# Patient Record
Sex: FEMALE | Race: WHITE | NOT HISPANIC OR LATINO | Employment: UNEMPLOYED | URBAN - NONMETROPOLITAN AREA
[De-identification: names, ages, dates, MRNs, and addresses within clinical notes are randomized per-mention and may not be internally consistent; named-entity substitution may affect disease eponyms.]

---

## 2017-01-11 ENCOUNTER — TELEPHONE (OUTPATIENT)
Dept: SURGERY | Facility: CLINIC | Age: 57
End: 2017-01-11

## 2017-03-09 DIAGNOSIS — R92.8 ABNORMAL MAMMOGRAM: Primary | ICD-10-CM

## 2017-05-18 ENCOUNTER — TELEPHONE (OUTPATIENT)
Dept: SURGERY | Facility: CLINIC | Age: 57
End: 2017-05-18

## 2017-07-17 ENCOUNTER — TELEPHONE (OUTPATIENT)
Dept: SURGERY | Facility: CLINIC | Age: 57
End: 2017-07-17

## 2017-07-17 NOTE — TELEPHONE ENCOUNTER
Called patient regarding her follow up appointment but no answer and was unable to leave a message,

## 2017-08-22 ENCOUNTER — TELEPHONE (OUTPATIENT)
Dept: SURGERY | Facility: CLINIC | Age: 57
End: 2017-08-22

## 2024-05-06 ENCOUNTER — HOSPITAL ENCOUNTER (EMERGENCY)
Facility: HOSPITAL | Age: 64
Discharge: LEFT BEFORE DISCHARGE | DRG: 322 | End: 2024-05-06
Admitting: STUDENT IN AN ORGANIZED HEALTH CARE EDUCATION/TRAINING PROGRAM
Payer: COMMERCIAL

## 2024-05-06 ENCOUNTER — APPOINTMENT (OUTPATIENT)
Dept: GENERAL RADIOLOGY | Facility: HOSPITAL | Age: 64
DRG: 322 | End: 2024-05-06
Payer: COMMERCIAL

## 2024-05-06 VITALS
RESPIRATION RATE: 20 BRPM | HEIGHT: 67 IN | HEART RATE: 103 BPM | SYSTOLIC BLOOD PRESSURE: 196 MMHG | BODY MASS INDEX: 25.9 KG/M2 | OXYGEN SATURATION: 98 % | DIASTOLIC BLOOD PRESSURE: 106 MMHG | TEMPERATURE: 98.1 F | WEIGHT: 165 LBS

## 2024-05-06 LAB
ALBUMIN SERPL-MCNC: 4 G/DL (ref 3.5–5.2)
ALBUMIN/GLOB SERPL: 1.1 G/DL
ALP SERPL-CCNC: 154 U/L (ref 39–117)
ALT SERPL W P-5'-P-CCNC: 17 U/L (ref 1–33)
ANION GAP SERPL CALCULATED.3IONS-SCNC: 13.4 MMOL/L (ref 5–15)
AST SERPL-CCNC: 24 U/L (ref 1–32)
BASOPHILS # BLD AUTO: 0.02 10*3/MM3 (ref 0–0.2)
BASOPHILS NFR BLD AUTO: 0.4 % (ref 0–1.5)
BILIRUB SERPL-MCNC: 0.6 MG/DL (ref 0–1.2)
BUN SERPL-MCNC: 7 MG/DL (ref 8–23)
BUN/CREAT SERPL: 8.5 (ref 7–25)
CALCIUM SPEC-SCNC: 9.7 MG/DL (ref 8.6–10.5)
CHLORIDE SERPL-SCNC: 99 MMOL/L (ref 98–107)
CO2 SERPL-SCNC: 23.6 MMOL/L (ref 22–29)
CREAT SERPL-MCNC: 0.82 MG/DL (ref 0.57–1)
DEPRECATED RDW RBC AUTO: 40.9 FL (ref 37–54)
EGFRCR SERPLBLD CKD-EPI 2021: 80 ML/MIN/1.73
EOSINOPHIL # BLD AUTO: 0.1 10*3/MM3 (ref 0–0.4)
EOSINOPHIL NFR BLD AUTO: 1.8 % (ref 0.3–6.2)
ERYTHROCYTE [DISTWIDTH] IN BLOOD BY AUTOMATED COUNT: 11.9 % (ref 12.3–15.4)
GLOBULIN UR ELPH-MCNC: 3.5 GM/DL
GLUCOSE SERPL-MCNC: 380 MG/DL (ref 65–99)
HCT VFR BLD AUTO: 41.7 % (ref 34–46.6)
HGB BLD-MCNC: 13.6 G/DL (ref 12–15.9)
HOLD SPECIMEN: NORMAL
HOLD SPECIMEN: NORMAL
IMM GRANULOCYTES # BLD AUTO: 0.01 10*3/MM3 (ref 0–0.05)
IMM GRANULOCYTES NFR BLD AUTO: 0.2 % (ref 0–0.5)
LYMPHOCYTES # BLD AUTO: 2.61 10*3/MM3 (ref 0.7–3.1)
LYMPHOCYTES NFR BLD AUTO: 46.4 % (ref 19.6–45.3)
MCH RBC QN AUTO: 30.2 PG (ref 26.6–33)
MCHC RBC AUTO-ENTMCNC: 32.6 G/DL (ref 31.5–35.7)
MCV RBC AUTO: 92.5 FL (ref 79–97)
MONOCYTES # BLD AUTO: 0.35 10*3/MM3 (ref 0.1–0.9)
MONOCYTES NFR BLD AUTO: 6.2 % (ref 5–12)
NEUTROPHILS NFR BLD AUTO: 2.54 10*3/MM3 (ref 1.7–7)
NEUTROPHILS NFR BLD AUTO: 45 % (ref 42.7–76)
NRBC BLD AUTO-RTO: 0 /100 WBC (ref 0–0.2)
PLATELET # BLD AUTO: 154 10*3/MM3 (ref 140–450)
PMV BLD AUTO: 10.2 FL (ref 6–12)
POTASSIUM SERPL-SCNC: 3 MMOL/L (ref 3.5–5.2)
PROT SERPL-MCNC: 7.5 G/DL (ref 6–8.5)
RBC # BLD AUTO: 4.51 10*6/MM3 (ref 3.77–5.28)
SODIUM SERPL-SCNC: 136 MMOL/L (ref 136–145)
TROPONIN T SERPL HS-MCNC: 55 NG/L
WBC NRBC COR # BLD AUTO: 5.63 10*3/MM3 (ref 3.4–10.8)
WHOLE BLOOD HOLD COAG: NORMAL
WHOLE BLOOD HOLD SPECIMEN: NORMAL

## 2024-05-06 PROCEDURE — 84484 ASSAY OF TROPONIN QUANT: CPT | Performed by: STUDENT IN AN ORGANIZED HEALTH CARE EDUCATION/TRAINING PROGRAM

## 2024-05-06 PROCEDURE — 93010 ELECTROCARDIOGRAM REPORT: CPT | Performed by: INTERNAL MEDICINE

## 2024-05-06 PROCEDURE — 93005 ELECTROCARDIOGRAM TRACING: CPT | Performed by: STUDENT IN AN ORGANIZED HEALTH CARE EDUCATION/TRAINING PROGRAM

## 2024-05-06 PROCEDURE — 71045 X-RAY EXAM CHEST 1 VIEW: CPT

## 2024-05-06 PROCEDURE — 71045 X-RAY EXAM CHEST 1 VIEW: CPT | Performed by: RADIOLOGY

## 2024-05-06 PROCEDURE — 99284 EMERGENCY DEPT VISIT MOD MDM: CPT

## 2024-05-06 PROCEDURE — 80053 COMPREHEN METABOLIC PANEL: CPT | Performed by: STUDENT IN AN ORGANIZED HEALTH CARE EDUCATION/TRAINING PROGRAM

## 2024-05-06 PROCEDURE — 85025 COMPLETE CBC W/AUTO DIFF WBC: CPT | Performed by: STUDENT IN AN ORGANIZED HEALTH CARE EDUCATION/TRAINING PROGRAM

## 2024-05-06 RX ORDER — ASPIRIN 81 MG/1
324 TABLET, CHEWABLE ORAL ONCE
Status: DISCONTINUED | OUTPATIENT
Start: 2024-05-06 | End: 2024-05-06 | Stop reason: HOSPADM

## 2024-05-06 RX ORDER — SODIUM CHLORIDE 0.9 % (FLUSH) 0.9 %
10 SYRINGE (ML) INJECTION AS NEEDED
Status: DISCONTINUED | OUTPATIENT
Start: 2024-05-06 | End: 2024-05-06 | Stop reason: HOSPADM

## 2024-05-06 NOTE — ED NOTES
Patient's daughter presented to the ED triage desk at this time. Daughter reports that her mother was tired of waiting and that she wanted to sign out against medical advise. Patient was educated about the importance of staying and getting treatment and having improvement in her condition versus leaving against medical advise and having a worsening in her condition or possible death. Patient and daughter both verbalize understanding of discharge instructions, Called lead RN and made her aware.

## 2024-05-06 NOTE — ED NOTES
MEDICAL SCREENING:    Reason for Visit: Chest pain    Patient initially seen in triage.  The patient was advised further evaluation and diagnostic testing will be needed, some of the treatment and testing will be initiated in the lobby in order to begin the process.  The patient will be returned to the waiting area for the time being and possibly be re-assessed by a subsequent ED provider.  The patient will be brought back to the treatment area in as timely manner as possible.      Radha Wilkinson, APRN  05/06/24 1542

## 2024-05-07 ENCOUNTER — HOSPITAL ENCOUNTER (INPATIENT)
Facility: HOSPITAL | Age: 64
LOS: 2 days | Discharge: HOME | DRG: 322 | End: 2024-05-09
Attending: STUDENT IN AN ORGANIZED HEALTH CARE EDUCATION/TRAINING PROGRAM | Admitting: HOSPITALIST
Payer: COMMERCIAL

## 2024-05-07 ENCOUNTER — APPOINTMENT (OUTPATIENT)
Dept: GENERAL RADIOLOGY | Facility: HOSPITAL | Age: 64
DRG: 322 | End: 2024-05-07
Payer: COMMERCIAL

## 2024-05-07 DIAGNOSIS — I21.4 NSTEMI (NON-ST ELEVATED MYOCARDIAL INFARCTION): Primary | ICD-10-CM

## 2024-05-07 LAB
ALBUMIN SERPL-MCNC: 3.8 G/DL (ref 3.5–5.2)
ALBUMIN/GLOB SERPL: 1.1 G/DL
ALP SERPL-CCNC: 145 U/L (ref 39–117)
ALT SERPL W P-5'-P-CCNC: 20 U/L (ref 1–33)
ANION GAP SERPL CALCULATED.3IONS-SCNC: 10.2 MMOL/L (ref 5–15)
APTT PPP: 26.5 SECONDS (ref 26.5–34.5)
AST SERPL-CCNC: 62 U/L (ref 1–32)
BASOPHILS # BLD AUTO: 0.01 10*3/MM3 (ref 0–0.2)
BASOPHILS NFR BLD AUTO: 0.1 % (ref 0–1.5)
BILIRUB SERPL-MCNC: 1 MG/DL (ref 0–1.2)
BILIRUB UR QL STRIP: NEGATIVE
BUN SERPL-MCNC: 7 MG/DL (ref 8–23)
BUN/CREAT SERPL: 9.7 (ref 7–25)
CALCIUM SPEC-SCNC: 9.6 MG/DL (ref 8.6–10.5)
CHLORIDE SERPL-SCNC: 101 MMOL/L (ref 98–107)
CLARITY UR: CLEAR
CO2 SERPL-SCNC: 24.8 MMOL/L (ref 22–29)
COLOR UR: YELLOW
CREAT SERPL-MCNC: 0.72 MG/DL (ref 0.57–1)
DEPRECATED RDW RBC AUTO: 41.1 FL (ref 37–54)
EGFRCR SERPLBLD CKD-EPI 2021: 93.5 ML/MIN/1.73
EOSINOPHIL # BLD AUTO: 0.03 10*3/MM3 (ref 0–0.4)
EOSINOPHIL NFR BLD AUTO: 0.4 % (ref 0.3–6.2)
ERYTHROCYTE [DISTWIDTH] IN BLOOD BY AUTOMATED COUNT: 12 % (ref 12.3–15.4)
GEN 5 2HR TROPONIN T REFLEX: 413 NG/L
GLOBULIN UR ELPH-MCNC: 3.6 GM/DL
GLUCOSE SERPL-MCNC: 348 MG/DL (ref 65–99)
GLUCOSE UR STRIP-MCNC: ABNORMAL MG/DL
HCT VFR BLD AUTO: 43.5 % (ref 34–46.6)
HGB BLD-MCNC: 14.2 G/DL (ref 12–15.9)
HGB UR QL STRIP.AUTO: NEGATIVE
HOLD SPECIMEN: NORMAL
HOLD SPECIMEN: NORMAL
IMM GRANULOCYTES # BLD AUTO: 0.03 10*3/MM3 (ref 0–0.05)
IMM GRANULOCYTES NFR BLD AUTO: 0.4 % (ref 0–0.5)
INR PPP: 1.08 (ref 0.9–1.1)
KETONES UR QL STRIP: NEGATIVE
LEUKOCYTE ESTERASE UR QL STRIP.AUTO: NEGATIVE
LYMPHOCYTES # BLD AUTO: 2.09 10*3/MM3 (ref 0.7–3.1)
LYMPHOCYTES NFR BLD AUTO: 27.3 % (ref 19.6–45.3)
MAGNESIUM SERPL-MCNC: 2.1 MG/DL (ref 1.6–2.4)
MCH RBC QN AUTO: 30.3 PG (ref 26.6–33)
MCHC RBC AUTO-ENTMCNC: 32.6 G/DL (ref 31.5–35.7)
MCV RBC AUTO: 92.8 FL (ref 79–97)
MONOCYTES # BLD AUTO: 0.56 10*3/MM3 (ref 0.1–0.9)
MONOCYTES NFR BLD AUTO: 7.3 % (ref 5–12)
NEUTROPHILS NFR BLD AUTO: 4.93 10*3/MM3 (ref 1.7–7)
NEUTROPHILS NFR BLD AUTO: 64.5 % (ref 42.7–76)
NITRITE UR QL STRIP: NEGATIVE
NRBC BLD AUTO-RTO: 0 /100 WBC (ref 0–0.2)
PH UR STRIP.AUTO: 7.5 [PH] (ref 5–8)
PLATELET # BLD AUTO: 185 10*3/MM3 (ref 140–450)
PMV BLD AUTO: 10.5 FL (ref 6–12)
POTASSIUM SERPL-SCNC: 3.1 MMOL/L (ref 3.5–5.2)
PROT SERPL-MCNC: 7.4 G/DL (ref 6–8.5)
PROT UR QL STRIP: NEGATIVE
PROTHROMBIN TIME: 14.1 SECONDS (ref 12.1–14.7)
QT INTERVAL: 348 MS
QT INTERVAL: 362 MS
QT INTERVAL: 366 MS
QTC INTERVAL: 452 MS
QTC INTERVAL: 462 MS
QTC INTERVAL: 469 MS
RBC # BLD AUTO: 4.69 10*6/MM3 (ref 3.77–5.28)
SODIUM SERPL-SCNC: 136 MMOL/L (ref 136–145)
SP GR UR STRIP: 1.03 (ref 1–1.03)
TROPONIN T DELTA: 40 NG/L
TROPONIN T SERPL HS-MCNC: 373 NG/L
UFH PPP CHRO-ACNC: <0.1 IU/ML (ref 0.3–0.7)
UROBILINOGEN UR QL STRIP: ABNORMAL
WBC NRBC COR # BLD AUTO: 7.65 10*3/MM3 (ref 3.4–10.8)
WHOLE BLOOD HOLD COAG: NORMAL
WHOLE BLOOD HOLD SPECIMEN: NORMAL

## 2024-05-07 PROCEDURE — 71045 X-RAY EXAM CHEST 1 VIEW: CPT

## 2024-05-07 PROCEDURE — 25010000002 HEPARIN (PORCINE) 25000-0.45 UT/250ML-% SOLUTION: Performed by: NURSE PRACTITIONER

## 2024-05-07 PROCEDURE — 25010000002 HEPARIN (PORCINE) PER 1000 UNITS: Performed by: NURSE PRACTITIONER

## 2024-05-07 PROCEDURE — 93005 ELECTROCARDIOGRAM TRACING: CPT | Performed by: STUDENT IN AN ORGANIZED HEALTH CARE EDUCATION/TRAINING PROGRAM

## 2024-05-07 PROCEDURE — 99223 1ST HOSP IP/OBS HIGH 75: CPT | Performed by: HOSPITALIST

## 2024-05-07 PROCEDURE — 99285 EMERGENCY DEPT VISIT HI MDM: CPT

## 2024-05-07 PROCEDURE — 85520 HEPARIN ASSAY: CPT | Performed by: NURSE PRACTITIONER

## 2024-05-07 PROCEDURE — 36415 COLL VENOUS BLD VENIPUNCTURE: CPT

## 2024-05-07 PROCEDURE — 85025 COMPLETE CBC W/AUTO DIFF WBC: CPT | Performed by: STUDENT IN AN ORGANIZED HEALTH CARE EDUCATION/TRAINING PROGRAM

## 2024-05-07 PROCEDURE — 81003 URINALYSIS AUTO W/O SCOPE: CPT | Performed by: NURSE PRACTITIONER

## 2024-05-07 PROCEDURE — 85520 HEPARIN ASSAY: CPT

## 2024-05-07 PROCEDURE — 93010 ELECTROCARDIOGRAM REPORT: CPT | Performed by: INTERNAL MEDICINE

## 2024-05-07 PROCEDURE — 83735 ASSAY OF MAGNESIUM: CPT | Performed by: STUDENT IN AN ORGANIZED HEALTH CARE EDUCATION/TRAINING PROGRAM

## 2024-05-07 PROCEDURE — 85610 PROTHROMBIN TIME: CPT | Performed by: NURSE PRACTITIONER

## 2024-05-07 PROCEDURE — 93005 ELECTROCARDIOGRAM TRACING: CPT | Performed by: NURSE PRACTITIONER

## 2024-05-07 PROCEDURE — 71045 X-RAY EXAM CHEST 1 VIEW: CPT | Performed by: RADIOLOGY

## 2024-05-07 PROCEDURE — 85025 COMPLETE CBC W/AUTO DIFF WBC: CPT | Performed by: NURSE PRACTITIONER

## 2024-05-07 PROCEDURE — 80053 COMPREHEN METABOLIC PANEL: CPT | Performed by: STUDENT IN AN ORGANIZED HEALTH CARE EDUCATION/TRAINING PROGRAM

## 2024-05-07 PROCEDURE — 85730 THROMBOPLASTIN TIME PARTIAL: CPT | Performed by: NURSE PRACTITIONER

## 2024-05-07 PROCEDURE — 84484 ASSAY OF TROPONIN QUANT: CPT | Performed by: STUDENT IN AN ORGANIZED HEALTH CARE EDUCATION/TRAINING PROGRAM

## 2024-05-07 RX ORDER — SENNOSIDES 8.6 MG
1300 CAPSULE ORAL 2 TIMES DAILY PRN
COMMUNITY
End: 2024-05-09 | Stop reason: HOSPADM

## 2024-05-07 RX ORDER — HEPARIN SODIUM 5000 [USP'U]/ML
50 INJECTION, SOLUTION INTRAVENOUS; SUBCUTANEOUS AS NEEDED
Status: DISCONTINUED | OUTPATIENT
Start: 2024-05-07 | End: 2024-05-07

## 2024-05-07 RX ORDER — HEPARIN SODIUM 10000 [USP'U]/100ML
15 INJECTION, SOLUTION INTRAVENOUS
Status: DISCONTINUED | OUTPATIENT
Start: 2024-05-07 | End: 2024-05-08

## 2024-05-07 RX ORDER — ACETAMINOPHEN 500 MG
1000 TABLET ORAL ONCE
Status: COMPLETED | OUTPATIENT
Start: 2024-05-07 | End: 2024-05-07

## 2024-05-07 RX ORDER — HEPARIN SODIUM 5000 [USP'U]/ML
25 INJECTION, SOLUTION INTRAVENOUS; SUBCUTANEOUS AS NEEDED
Status: DISCONTINUED | OUTPATIENT
Start: 2024-05-07 | End: 2024-05-07

## 2024-05-07 RX ORDER — POTASSIUM CHLORIDE 20 MEQ/1
40 TABLET, EXTENDED RELEASE ORAL ONCE
Status: COMPLETED | OUTPATIENT
Start: 2024-05-07 | End: 2024-05-07

## 2024-05-07 RX ORDER — HEPARIN SODIUM 5000 [USP'U]/ML
4000 INJECTION, SOLUTION INTRAVENOUS; SUBCUTANEOUS ONCE
Status: COMPLETED | OUTPATIENT
Start: 2024-05-07 | End: 2024-05-07

## 2024-05-07 RX ORDER — SODIUM CHLORIDE 0.9 % (FLUSH) 0.9 %
10 SYRINGE (ML) INJECTION AS NEEDED
Status: DISCONTINUED | OUTPATIENT
Start: 2024-05-07 | End: 2024-05-09 | Stop reason: HOSPADM

## 2024-05-07 RX ORDER — ASPIRIN 81 MG/1
324 TABLET, CHEWABLE ORAL ONCE
Status: DISCONTINUED | OUTPATIENT
Start: 2024-05-07 | End: 2024-05-08

## 2024-05-07 RX ADMIN — NITROGLYCERIN 1 INCH: 20 OINTMENT TOPICAL at 13:36

## 2024-05-07 RX ADMIN — NITROGLYCERIN 1 INCH: 20 OINTMENT TOPICAL at 18:04

## 2024-05-07 RX ADMIN — POTASSIUM CHLORIDE 40 MEQ: 1500 TABLET, EXTENDED RELEASE ORAL at 13:36

## 2024-05-07 RX ADMIN — ACETAMINOPHEN 1000 MG: 500 TABLET ORAL at 17:21

## 2024-05-07 RX ADMIN — HEPARIN SODIUM 12 UNITS/KG/HR: 10000 INJECTION, SOLUTION INTRAVENOUS at 18:03

## 2024-05-07 RX ADMIN — HEPARIN SODIUM 4000 UNITS: 5000 INJECTION INTRAVENOUS; SUBCUTANEOUS at 18:02

## 2024-05-07 NOTE — Clinical Note
First balloon inflation max pressure = 16 mandi. First balloon inflation duration = 9 seconds. Second inflation of balloon - Max pressure = 16 mandi. 2nd Inflation of balloon - Duration = 4 seconds.

## 2024-05-07 NOTE — H&P
[facility] HOSPITALIST HISTORY AND PHYSICAL    Patient Identification:  Name:  Liyah Beverly  Age:  64 y.o.  Sex:  female  :  1960  MRN:  [id number]   Visit Number:  [id number]  Admit Date: 2024   Room number:  403/03  Primary Care Physician:  Naveed Granados DO     Chief complaint:    Chief Complaint   Patient presents with    Chest Pain       History of presenting illness:  64 y.o. female with history significant for DVT x 2 and PE, she was supposed to be on anticoagulation, in 2016 she self discontinued her medication, she has history of osteoarthritis, essential hypertension presented to emergency room with chief complaints of burning sensation of the chest.    As per patient started 3 days prior to admission described as burning sensation retrosternal radiating to the throat, she thought it is reflux she took Mylanta with some relief.  The next day it continued on and off sometimes associated with activity sometimes at rest.  She decided come to the emergency room yesterday but could not wait for the results of blood test she left.  During that time EKG left anterior fascicular block and LVH with mild elevation of the ST.  V1 V2..  As per patient at home her chest discomfort continued associated with pain on both shoulders and tingling of both hands.  She went her family doctor and was advised to come to the emergency room.  In the route she received nitroglycerin and aspirin which relieved her symptoms.    At the emergency room her blood pressure is 152/93 heart rate 81 O2 saturation was 92 to 95% EKG no ST elevation with LVH and left anterior fascicular block, initial troponin yesterday was 55 today it is 373, repeat is 413 with a delta of +40.  She is also noted to have glucose of 348, she denies history of diabetes.    Case was discussed with Dr. Rust and recommended to have the case and EKG reviewed by Dr. Cruz/interventional cardiology, Dr. Cruz recommended start the  Pt called and she was informed that her script was sent in   patient heparin drip beta-blocker aspirin and statins.  ---------------------------------------------------------------------------------------------------------------------   Review of Systems   Constitutional:  Negative for activity change, appetite change, chills, diaphoresis, fatigue, fever and unexpected weight change.   HENT: Negative.     Eyes: Negative.    Respiratory: Negative.     Cardiovascular:  Positive for chest pain. Negative for palpitations and leg swelling.   Gastrointestinal: Negative.    Endocrine: Negative.    Genitourinary: Negative.    Musculoskeletal: Negative.    Skin: Negative.    Allergic/Immunologic: Negative.    Neurological: Negative.    Hematological: Negative.    Psychiatric/Behavioral: Negative.         ---------------------------------------------------------------------------------------------------------------------   Past Medical History:   Diagnosis Date    Arthritis     Deep venous thrombosis     Hypertension     Pulmonary embolism      Past Surgical History:   Procedure Laterality Date    BREAST SURGERY      COLONOSCOPY      HYSTERECTOMY      INNER EAR SURGERY      OTHER SURGICAL HISTORY      diagnostic esophagogastroduodenoscopy    TUBAL ABDOMINAL LIGATION       Family History   Problem Relation Age of Onset    Heart disease Mother     Diabetes Mother     Diabetes Sister     Heart disease Brother     Diabetes Brother      Social History     Socioeconomic History    Marital status:    Tobacco Use    Smoking status: Former   Substance and Sexual Activity    Alcohol use: No    Drug use: No     ---------------------------------------------------------------------------------------------------------------------   Allergies:  Patient has no known allergies.  ---------------------------------------------------------------------------------------------------------------------   Prior to Admission Medications       Prescriptions Last Dose Informant Patient Reported? Taking?     atorvastatin (LIPITOR) 20 MG tablet   Yes No    Take  by mouth.    lisinopril-hydrochlorothiazide (PRINZIDE,ZESTORETIC) 20-12.5 MG per tablet   Yes No    Take  by mouth.    meloxicam (MOBIC) 7.5 MG tablet   Yes No    Take 7.5 mg by mouth daily.    pantoprazole (PROTONIX) 40 MG EC tablet   Yes No    Take  by mouth.    ranitidine (ZANTAC) 150 MG tablet   Yes No    Take  by mouth.    rivaroxaban (XARELTO) 20 MG tablet   Yes No    Take  by mouth.          ---------------------------------------------------------------------------------------------------------------------   Vital Signs:  Temp:  [98.6 °F (37 °C)] 98.6 °F (37 °C)  Heart Rate:  [81-98] 98  Resp:  [18] 18  BP: (152-171)/(88-97) 157/97    Mean Arterial Pressure (Non-Invasive) for the past 24 hrs (Last 3 readings):   Noninvasive MAP (mmHg)   05/07/24 1700 116   05/07/24 1615 118   05/07/24 1600 113     SpO2:  [92 %-95 %] 94 %  on   ;   Device (Oxygen Therapy): room air  Body mass index is 25.82 kg/m².    Wt Readings from Last 3 Encounters:   05/07/24 74.8 kg (164 lb 14.5 oz)   05/06/24 74.8 kg (165 lb)   05/12/16 99.6 kg (219 lb 9.6 oz)               ---------------------------------------------------------------------------------------------------------------------   Physical Exam:  Constitutional:  Well-developed and well-nourished.  No respiratory distress.      HENT:  Head: Normocephalic and atraumatic.  Mouth:  Moist mucous membranes.    Eyes:  Conjunctivae and EOM are normal.  Pupils are equal, round, and reactive to light.  No scleral icterus.  Neck:  Neck supple.  No JVD present.    Cardiovascular:  Normal rate, regular rhythm and normal heart sounds with no murmur.  Pulmonary/Chest:  No respiratory distress, no wheezes, no crackles, with normal breath sounds and good air movement.  Abdominal:  Soft.  Bowel sounds are normal.  No distension and no tenderness.   Musculoskeletal:  No edema, no tenderness, and no deformity.  No red or swollen joints  "anywhere.    Neurological:  Alert and oriented to person, place, and time.  No cranial nerve deficit.  No tongue deviation.  No facial droop.  No slurred speech.   Skin:  Skin is warm and dry.  No rash noted.  No pallor.   Peripheral vascular:  No edema and strong pulses on all 4 extremities.  Genitourinary:  ---------------------------------------------------------------------------------------------------------------------  EKG:                Telemetry: Sinus rhythm 86 bpm  I have personally looked at both the EKG and the telemetry strips.  --------------------------------------------------------------------------------------------------------------------  Results from last 7 days   Lab Units 05/07/24  1457 05/07/24  1242 05/06/24  1556   HSTROP T ng/L 413* 373* 55*     Results from last 7 days   Lab Units 05/07/24  1640 05/07/24 1242 05/06/24  1556   WBC 10*3/mm3  --  7.65 5.63   HEMOGLOBIN g/dL  --  14.2 13.6   HEMATOCRIT %  --  43.5 41.7   MCV fL  --  92.8 92.5   MCHC g/dL  --  32.6 32.6   PLATELETS 10*3/mm3  --  185 154   INR  1.08  --   --      Results from last 7 days   Lab Units 05/07/24  1242 05/06/24  1556   SODIUM mmol/L 136 136   POTASSIUM mmol/L 3.1* 3.0*   MAGNESIUM mg/dL 2.1  --    CHLORIDE mmol/L 101 99   CO2 mmol/L 24.8 23.6   BUN mg/dL 7* 7*   CREATININE mg/dL 0.72 0.82   CALCIUM mg/dL 9.6 9.7   GLUCOSE mg/dL 348* 380*   ALBUMIN g/dL 3.8 4.0   BILIRUBIN mg/dL 1.0 0.6   ALK PHOS U/L 145* 154*   AST (SGOT) U/L 62* 24   ALT (SGPT) U/L 20 17   Estimated Creatinine Clearance: 83.4 mL/min (by C-G formula based on SCr of 0.72 mg/dL).    No results found for: \"HGBA1C\", \"POCGLU\"  No results found for: \"AMMONIA\"    Results from last 7 days   Lab Units 05/07/24  1559   NITRITE UA  Negative     No results found for: \"BLOODCX\"No results found for: \"RESPCX\"No results found for: \"URINECX\"No results found for: \"WOUNDCX\"No results found for: \"BODYFLDCX\"No results found for: \"STOOLCX\"  pH No results found for: " "\"PHART\"   pO2 No results found for: \"PO2ART\"   pCO2 No results found for: \"SDA6KYF\"   HCO3 No results found for: \"PVL8UDQ\"     I have personally looked at the labs and they are summarized above.  ----------------------------------------------------------------------------------------------------------------------  Imaging Results (Last 24 Hours)       Procedure Component Value Units Date/Time    XR Chest 1 View [[id number]] Collected: 05/07/24 1341     Updated: 05/07/24 1343    Narrative:      XR CHEST 1 VW-     CLINICAL INDICATION: Chest Pain Protocol        COMPARISON: None immediately available      TECHNIQUE: Single frontal view of the chest.     FINDINGS:     LUNGS: Lungs are adequately aerated.      HEART AND MEDIASTINUM: Heart and mediastinal contours are unremarkable        SKELETON: Bony and soft tissue structures are unremarkable.             Impression:      No radiographic evidence of acute cardiac or pulmonary disease.           This report was finalized on 5/7/2024 1:41 PM by Dr. Saravanan Morales MD.             I have personally reviewed the radiology images and read the final radiology report.  ----------------------------------------------------------------------------------------------------------------------  Assessment and Plan:  -Chest pain non-STEMI type I  -History of essential hypertension  -History of hyperlipidemia  -History of DVT x 2 and 1 PE patient stopped taking anticoagulation 2016   -History of osteoarthritis  -Hyperglycemia    Case discussed with Dr. Cruz/interventional cardiology EKG reviewed recommended medication management at this time and heparin drip will keep the patient n.p.o. for possible cardiac cath in the morning, check 2D echo, stat lipid panel, TSH, monitor electrolytes.  Check A1c, Accu-Chek and sliding scale.    With regards to her history of multiple DVT and PE, patient was supposed to be on blood thinner for life but she discontinued it herself stating she does not " think she needs it anymore.  Will continue heparin drip, she will be needing anticoagulation on discharge.    Plan outlined to patient together with her  and agreed, further management may change as condition evolves.    Audrey Page MD  05/07/24  17:40 EDT

## 2024-05-07 NOTE — ED PROVIDER NOTES
Subjective   History of Present Illness  Patient is a 64-year-old female who presents today complaining of chest discomfort.  Patient reports that she has had heartburn off and on for several days.  Patient reports that today that the pain was more in her anterior chest and states that it was a pressure and states that she had pain down both arms and states that she had tingling in her fingertips bilaterally.  Patient denies any shortness of breath.  Patient denies diaphoresis.  Patient denies cough or hemoptysis.  Patient denies changes in bowel or bladder patterns.  Patient reports that she went to [facility] today and they sent her to the ER for further evaluation.  She reports that she has had 4 baby aspirin for total of 3 and 24 mg prior to arrival.  Patient reports that they did give her 1 sublingual nitroglycerin in the ambulance and states that that did resolve chest pain.  Patient denies chest pain currently.    Chest Pain      Review of Systems   Constitutional: Negative.    HENT: Negative.     Eyes: Negative.    Respiratory: Negative.     Cardiovascular:  Positive for chest pain.   Gastrointestinal: Negative.    Endocrine: Negative.    Genitourinary: Negative.    Musculoskeletal: Negative.    Skin: Negative.    Allergic/Immunologic: Negative.    Neurological: Negative.    Hematological: Negative.    Psychiatric/Behavioral: Negative.         Past Medical History:   Diagnosis Date    Arthritis     Deep venous thrombosis     Hypertension     Pulmonary embolism        No Known Allergies    Past Surgical History:   Procedure Laterality Date    BREAST SURGERY      COLONOSCOPY      HYSTERECTOMY      INNER EAR SURGERY      OTHER SURGICAL HISTORY      diagnostic esophagogastroduodenoscopy    TUBAL ABDOMINAL LIGATION         Family History   Problem Relation Age of Onset    Heart disease Mother     Diabetes Mother     Diabetes Sister     Heart disease Brother     Diabetes Brother        Social History      Socioeconomic History    Marital status:    Tobacco Use    Smoking status: Former   Substance and Sexual Activity    Alcohol use: No    Drug use: No           Objective   Physical Exam  Vitals and nursing note reviewed.   Constitutional:       Appearance: She is well-developed.   HENT:      Head: Normocephalic.      Right Ear: External ear normal.      Left Ear: External ear normal.   Eyes:      Conjunctiva/sclera: Conjunctivae normal.      Pupils: Pupils are equal, round, and reactive to light.   Cardiovascular:      Rate and Rhythm: Normal rate and regular rhythm.      Heart sounds: Normal heart sounds.   Pulmonary:      Effort: Pulmonary effort is normal.      Breath sounds: Normal breath sounds.   Abdominal:      General: Bowel sounds are normal.      Palpations: Abdomen is soft.   Musculoskeletal:         General: Normal range of motion.      Cervical back: Normal range of motion and neck supple.   Skin:     General: Skin is warm and dry.      Capillary Refill: Capillary refill takes less than 2 seconds.   Neurological:      Mental Status: She is alert and oriented to person, place, and time.   Psychiatric:         Behavior: Behavior normal.         Thought Content: Thought content normal.         Procedures           ED Course  ED Course as of 05/07/24 1400   Tue May 07, 2024   1304 EKG notes sinus rhythm.  99 bpm.  Left anterior fascicular block noted.  No acute ST elevation.  QTc 469.  Electronically signed by Ghassan Stearns DO, 05/07/24, 1:05 PM EDT.   [SF]      ED Course User Index  [SF] Ghassan Stearns DO                                             Medical Decision Making  Amount and/or Complexity of Data Reviewed  Labs: ordered.  Radiology: ordered.  ECG/medicine tests: ordered.    Risk  OTC drugs.  Prescription drug management.        Final diagnoses:   None       ED Disposition  ED Disposition       None            No follow-up provider specified.       Medication List      No changes were  made to your prescriptions during this visit.          Daily.     atorvastatin 80 MG tablet  Commonly known as: LIPITOR  Take 1 tablet by mouth Every Night.     B-D UF III MINI PEN NEEDLES 31G X 5 MM misc  Generic drug: Insulin Pen Needle  Use to inject insulin under the skin into the appropriate area as directed Daily.     Brilinta 90 MG tablet tablet  Generic drug: ticagrelor  Take 1 tablet by mouth 2 (Two) Times a Day.     carvedilol 6.25 MG tablet  Commonly known as: COREG  Take 1 tablet by mouth 2 (Two) Times a Day With Meals.     Eliquis 5 MG tablet tablet  Generic drug: apixaban  Take 1 tablet by mouth 2 (Two) Times a Day.     famotidine 20 MG tablet  Commonly known as: Pepcid  Take 1 tablet by mouth 2 (Two) Times a Day.     Jardiance 10 MG tablet tablet  Generic drug: empagliflozin  Take 1 tablet by mouth Daily.     Lantus SoloStar 100 UNIT/ML injection pen  Generic drug: Insulin Glargine  Inject 10 Units under the skin into the appropriate area as directed Daily.     losartan 25 MG tablet  Commonly known as: COZAAR  Take 1 tablet by mouth Daily.     nitroglycerin 0.4 MG SL tablet  Commonly known as: NITROSTAT  Place 1 tablet under the tongue Every 5 minutes as needed for chest pain (systolic blood pressure greater than 100). Take no more than 3 doses in 15 minutes.     ONE TOUCH ULTRA 2 w/Device kit  Use to check blood sugar before breakfast, supper, and at bedtime or as instructed.     OneTouch Delica Plus [id number] misc  Use 2 (Two) times a day before meals as instructed.     OneTouch Ultra test strip  Generic drug: glucose blood  Use as instructed before breakfast and before supper and at bedtime.            Stop      acetaminophen 650 MG 8 hr tablet  Commonly known as: TYLENOL               Where to Get Your Medications        These medications were sent to [facility]  [address] [place] [address]      Hours: Monday to Friday 7 AM to 6 PM Phone: [phone]   Aspirin Low Dose 81 MG EC tablet  atorvastatin 80 MG tablet  B-D UF III  MINI PEN NEEDLES 31G X 5 MM misc  Brilinta 90 MG tablet tablet  carvedilol 6.25 MG tablet  Eliquis 5 MG tablet tablet  famotidine 20 MG tablet  Jardiance 10 MG tablet tablet  Lantus SoloStar 100 UNIT/ML injection pen  losartan 25 MG tablet  nitroglycerin 0.4 MG SL tablet  ONE TOUCH ULTRA 2 w/Device kit  OneTouch Delica Plus [id number] misc  OneTouch Ultra test strip            Uriah España, APRN  05/24/24 1009

## 2024-05-07 NOTE — Clinical Note
A 4 fr sheath was successfully inserted with ultrasound guidance into the right femoral artery. Sheath insertion not delayed.

## 2024-05-07 NOTE — CASE MANAGEMENT/SOCIAL WORK
Discharge Planning Assessment   [facility]     Patient Name: Liyah Foote  MRN: [id number]  Today's Date: 5/7/2024    Admit Date: 5/7/2024    Plan: Spoke with patient and family at bedside. Patient lives at home and will return at discharge. Patient has no POA or Living Will. Patient uses no DME, Oxygen or HH. Patient PCP Naveed Granados and [facility]. Patients family will transport home at discharge.   Discharge Needs Assessment       Row Name 05/07/24 1652       Living Environment    People in Home spouse    Name(s) of People in Home BRODIE FOOTE Spouse [phone]    Potentially Unsafe Housing Conditions none    In the past 12 months has the electric, gas, oil, or water company threatened to shut off services in your home? No    Primary Care Provided by self    Provides Primary Care For no one    Family Caregiver if Needed spouse    Quality of Family Relationships helpful;involved;supportive    Able to Return to Prior Arrangements yes       Resource/Environmental Concerns    Resource/Environmental Concerns none    Transportation Concerns none       Transportation Needs    In the past 12 months, has lack of transportation kept you from medical appointments or from getting medications? no    In the past 12 months, has lack of transportation kept you from meetings, work, or from getting things needed for daily living? No       Food Insecurity    Within the past 12 months, you worried that your food would run out before you got the money to buy more. Never true    Within the past 12 months, the food you bought just didn't last and you didn't have money to get more. Never true       Transition Planning    Patient/Family Anticipates Transition to home with family    Patient/Family Anticipated Services at Transition none    Transportation Anticipated family or friend will provide       Discharge Needs Assessment    Readmission Within the Last 30 Days no previous admission in last 30 days    Equipment  Currently Used at Home none    Concerns to be Addressed discharge planning    Anticipated Changes Related to Illness none    Equipment Needed After Discharge none                   Discharge Plan       Row Name 05/07/24 5138       Plan    Plan Spoke with patient and family at bedside. Patient lives at home and will return at discharge. Patient has no POA or Living Will. Patient uses no DME, Oxygen or HH. Patient PCP Naveed Granados and [facility]. Patients family will transport home at discharge.    Patient/Family in Agreement with Plan yes                  Continued Care and Services - Admitted Since 5/7/2024    No active coordination exists for this encounter.          Demographic Summary    No documentation.                Diane Fox

## 2024-05-07 NOTE — Clinical Note
First balloon inflation max pressure = 8 mandi. First balloon inflation duration = 6 seconds. Second inflation of balloon - Max pressure = 4 mandi. 2nd Inflation of balloon - Duration = 6 seconds. Third inflation of balloon - Max pressure = 10 mandi. 3rd Inflation of balloon - Duration = 17 seconds.

## 2024-05-07 NOTE — PROGRESS NOTES
[facility]PARIN INFUSION  Liyah Beverly is a  64 y.o. female receiving heparin infusion.     Therapy for (VTE/Cardiac):   Cardiac  Patient Dosing Weight: 74.8 kg  Initial Bolus (Y/N):   Y  Any Bolus (Y/N):   Y        Signs or Symptoms of Bleeding: N    Cardiac or Other (Not VTE)   Initial Bolus: 60 units/kg (Max 4,000 units)  Initial rate: 12 units/kg/hr (Max 1,000 units/hr)   Anti Xa Rebolus Infusion Hold time Change infusion Dose (Units/kg/hr) Next Anti Xa or aPTT Level Due   < 0.11 50 Units/kg  (4000 Units Max) None Increase by  3 Units/kg/hr 6 hours   0.11- 0.19 25 Units/kg  (2000 Units Max) None Increase by  2 Units/kg/hr 6 hours   0.2 - 0.29 0 None Increase by  1 Units/kg/hr 6 hours   0.3 - 0.5 0 None No Change 6 hours (after 2 consecutive levels in range check q24h @0700)   0.51 - 0.6 0 None Decrease by  1 Units/kg/hr 6 hours   0.61 - 0.8 0 30 Minutes Decrease by  2 Units/kg/hr 6 hours   0.81 - 1 0 60 Minutes Decrease by  3 Units/kg/hr 6 hours   >1 0 Hold  After Anti Xa less than 0.5 decrease previous rate by  4 Units/kg/hr  Every 2 hours until Anti Xa  less than 0.5 then when infusion restarts in 6 hours         Recommend anti-Xa every 6 hours.          Date   Time   Anti-Xa Current Rate (Unit/kg/hr) Bolus   (Units) Rate Change   (Unit/kg/hr) New Rate (Unit/kg/hr) Next   Anti-Xa Comments  Pump Check Daily   1640 1640 <0.1 - 4000 initial 12 2330 Discussed initial rate with nurse Pedroza.                                                                                                                                                                                                                                  Pharmacy will continue to follow anti-Xa results and monitor for signs and symptoms of bleeding or thrombosis.    Thank you,    Kelsy Diallo, PharmD  5/7/2024  17:03 EDT

## 2024-05-08 ENCOUNTER — APPOINTMENT (OUTPATIENT)
Dept: CARDIOLOGY | Facility: HOSPITAL | Age: 64
DRG: 322 | End: 2024-05-08
Payer: COMMERCIAL

## 2024-05-08 LAB
ALBUMIN SERPL-MCNC: 3.6 G/DL (ref 3.5–5.2)
ALBUMIN/GLOB SERPL: 0.9 G/DL
ALP SERPL-CCNC: 173 U/L (ref 39–117)
ALT SERPL W P-5'-P-CCNC: 19 U/L (ref 1–33)
ANION GAP SERPL CALCULATED.3IONS-SCNC: 10.3 MMOL/L (ref 5–15)
AST SERPL-CCNC: 53 U/L (ref 1–32)
BASOPHILS # BLD AUTO: 0.02 10*3/MM3 (ref 0–0.2)
BASOPHILS # BLD AUTO: 0.02 10*3/MM3 (ref 0–0.2)
BASOPHILS NFR BLD AUTO: 0.2 % (ref 0–1.5)
BASOPHILS NFR BLD AUTO: 0.2 % (ref 0–1.5)
BH CV ECHO MEAS - AO MAX PG: 8.4 MMHG
BH CV ECHO MEAS - AO MEAN PG: 5 MMHG
BH CV ECHO MEAS - AO ROOT DIAM: 2.9 CM
BH CV ECHO MEAS - AO V2 MAX: 145 CM/SEC
BH CV ECHO MEAS - AO V2 VTI: 26.2 CM
BH CV ECHO MEAS - EDV(CUBED): 85.2 ML
BH CV ECHO MEAS - EDV(MOD-SP4): 53.2 ML
BH CV ECHO MEAS - EF(MOD-BP): 44 %
BH CV ECHO MEAS - EF(MOD-SP4): 44 %
BH CV ECHO MEAS - ESV(CUBED): 24.1 ML
BH CV ECHO MEAS - ESV(MOD-SP4): 29.8 ML
BH CV ECHO MEAS - FS: 34.3 %
BH CV ECHO MEAS - IVS/LVPW: 0.72 CM
BH CV ECHO MEAS - IVSD: 1.18 CM
BH CV ECHO MEAS - LA DIMENSION: 2.9 CM
BH CV ECHO MEAS - LAT PEAK E' VEL: 7.5 CM/SEC
BH CV ECHO MEAS - LV DIASTOLIC VOL/BSA (35-75): 29.3 CM2
BH CV ECHO MEAS - LV MASS(C)D: 242.9 GRAMS
BH CV ECHO MEAS - LV SYSTOLIC VOL/BSA (12-30): 16.4 CM2
BH CV ECHO MEAS - LVIDD: 4.4 CM
BH CV ECHO MEAS - LVIDS: 2.9 CM
BH CV ECHO MEAS - LVOT AREA: 2.01 CM2
BH CV ECHO MEAS - LVOT DIAM: 1.6 CM
BH CV ECHO MEAS - LVPWD: 1.64 CM
BH CV ECHO MEAS - MED PEAK E' VEL: 5.9 CM/SEC
BH CV ECHO MEAS - MV A MAX VEL: 122 CM/SEC
BH CV ECHO MEAS - MV E MAX VEL: 87.5 CM/SEC
BH CV ECHO MEAS - MV E/A: 0.72
BH CV ECHO MEAS - PA ACC TIME: 0.1 SEC
BH CV ECHO MEAS - SV(MOD-SP4): 23.4 ML
BH CV ECHO MEAS - SVI(MOD-SP4): 12.9 ML/M2
BH CV ECHO MEAS - TAPSE (>1.6): 1.8 CM
BH CV ECHO MEASUREMENTS AVERAGE E/E' RATIO: 13.06
BILIRUB SERPL-MCNC: 1.8 MG/DL (ref 0–1.2)
BUN SERPL-MCNC: 7 MG/DL (ref 8–23)
BUN/CREAT SERPL: 11.5 (ref 7–25)
CALCIUM SPEC-SCNC: 9 MG/DL (ref 8.6–10.5)
CATH EF ESTIMATED: 55 %
CHLORIDE SERPL-SCNC: 101 MMOL/L (ref 98–107)
CHOLEST SERPL-MCNC: 189 MG/DL (ref 0–200)
CO2 SERPL-SCNC: 22.7 MMOL/L (ref 22–29)
CREAT SERPL-MCNC: 0.61 MG/DL (ref 0.57–1)
DEPRECATED RDW RBC AUTO: 41.5 FL (ref 37–54)
DEPRECATED RDW RBC AUTO: 41.6 FL (ref 37–54)
EGFRCR SERPLBLD CKD-EPI 2021: 100 ML/MIN/1.73
EOSINOPHIL # BLD AUTO: 0.02 10*3/MM3 (ref 0–0.4)
EOSINOPHIL # BLD AUTO: 0.03 10*3/MM3 (ref 0–0.4)
EOSINOPHIL NFR BLD AUTO: 0.2 % (ref 0.3–6.2)
EOSINOPHIL NFR BLD AUTO: 0.4 % (ref 0.3–6.2)
ERYTHROCYTE [DISTWIDTH] IN BLOOD BY AUTOMATED COUNT: 11.9 % (ref 12.3–15.4)
ERYTHROCYTE [DISTWIDTH] IN BLOOD BY AUTOMATED COUNT: 12 % (ref 12.3–15.4)
GLOBULIN UR ELPH-MCNC: 3.8 GM/DL
GLUCOSE BLDC GLUCOMTR-MCNC: 156 MG/DL (ref 70–130)
GLUCOSE BLDC GLUCOMTR-MCNC: 203 MG/DL (ref 70–130)
GLUCOSE BLDC GLUCOMTR-MCNC: 208 MG/DL (ref 70–130)
GLUCOSE BLDC GLUCOMTR-MCNC: 226 MG/DL (ref 70–130)
GLUCOSE BLDC GLUCOMTR-MCNC: 244 MG/DL (ref 70–130)
GLUCOSE SERPL-MCNC: 271 MG/DL (ref 65–99)
HBA1C MFR BLD: 9.8 % (ref 4.8–5.6)
HCT VFR BLD AUTO: 41.7 % (ref 34–46.6)
HCT VFR BLD AUTO: 42.9 % (ref 34–46.6)
HDLC SERPL-MCNC: 44 MG/DL (ref 40–60)
HGB BLD-MCNC: 13.5 G/DL (ref 12–15.9)
HGB BLD-MCNC: 13.8 G/DL (ref 12–15.9)
IMM GRANULOCYTES # BLD AUTO: 0.02 10*3/MM3 (ref 0–0.05)
IMM GRANULOCYTES # BLD AUTO: 0.03 10*3/MM3 (ref 0–0.05)
IMM GRANULOCYTES NFR BLD AUTO: 0.2 % (ref 0–0.5)
IMM GRANULOCYTES NFR BLD AUTO: 0.3 % (ref 0–0.5)
LDLC SERPL CALC-MCNC: 127 MG/DL (ref 0–100)
LDLC/HDLC SERPL: 2.85 {RATIO}
LEFT ATRIUM VOLUME INDEX: 32.4 ML/M2
LYMPHOCYTES # BLD AUTO: 2.96 10*3/MM3 (ref 0.7–3.1)
LYMPHOCYTES # BLD AUTO: 3.2 10*3/MM3 (ref 0.7–3.1)
LYMPHOCYTES NFR BLD AUTO: 33.2 % (ref 19.6–45.3)
LYMPHOCYTES NFR BLD AUTO: 38.1 % (ref 19.6–45.3)
MCH RBC QN AUTO: 30.3 PG (ref 26.6–33)
MCH RBC QN AUTO: 30.4 PG (ref 26.6–33)
MCHC RBC AUTO-ENTMCNC: 32.2 G/DL (ref 31.5–35.7)
MCHC RBC AUTO-ENTMCNC: 32.4 G/DL (ref 31.5–35.7)
MCV RBC AUTO: 93.9 FL (ref 79–97)
MCV RBC AUTO: 94.1 FL (ref 79–97)
MONOCYTES # BLD AUTO: 0.7 10*3/MM3 (ref 0.1–0.9)
MONOCYTES # BLD AUTO: 0.79 10*3/MM3 (ref 0.1–0.9)
MONOCYTES NFR BLD AUTO: 8.3 % (ref 5–12)
MONOCYTES NFR BLD AUTO: 8.9 % (ref 5–12)
NEUTROPHILS NFR BLD AUTO: 4.43 10*3/MM3 (ref 1.7–7)
NEUTROPHILS NFR BLD AUTO: 5.1 10*3/MM3 (ref 1.7–7)
NEUTROPHILS NFR BLD AUTO: 52.8 % (ref 42.7–76)
NEUTROPHILS NFR BLD AUTO: 57.2 % (ref 42.7–76)
NRBC BLD AUTO-RTO: 0 /100 WBC (ref 0–0.2)
NRBC BLD AUTO-RTO: 0 /100 WBC (ref 0–0.2)
PLATELET # BLD AUTO: 155 10*3/MM3 (ref 140–450)
PLATELET # BLD AUTO: 161 10*3/MM3 (ref 140–450)
PMV BLD AUTO: 10.6 FL (ref 6–12)
PMV BLD AUTO: 11.4 FL (ref 6–12)
POTASSIUM SERPL-SCNC: 3.4 MMOL/L (ref 3.5–5.2)
POTASSIUM SERPL-SCNC: 4.3 MMOL/L (ref 3.5–5.2)
PROT SERPL-MCNC: 7.4 G/DL (ref 6–8.5)
RBC # BLD AUTO: 4.44 10*6/MM3 (ref 3.77–5.28)
RBC # BLD AUTO: 4.56 10*6/MM3 (ref 3.77–5.28)
SODIUM SERPL-SCNC: 134 MMOL/L (ref 136–145)
TRIGL SERPL-MCNC: 98 MG/DL (ref 0–150)
TSH SERPL DL<=0.05 MIU/L-ACNC: 1.15 UIU/ML (ref 0.27–4.2)
UFH PPP CHRO-ACNC: 0.1 IU/ML (ref 0.3–0.7)
UFH PPP CHRO-ACNC: 0.33 IU/ML (ref 0.3–0.7)
VLDLC SERPL-MCNC: 18 MG/DL (ref 5–40)
WBC NRBC COR # BLD AUTO: 8.4 10*3/MM3 (ref 3.4–10.8)
WBC NRBC COR # BLD AUTO: 8.92 10*3/MM3 (ref 3.4–10.8)

## 2024-05-08 PROCEDURE — 93010 ELECTROCARDIOGRAM REPORT: CPT | Performed by: SPECIALIST

## 2024-05-08 PROCEDURE — 93306 TTE W/DOPPLER COMPLETE: CPT

## 2024-05-08 PROCEDURE — 027034Z DILATION OF CORONARY ARTERY, ONE ARTERY WITH DRUG-ELUTING INTRALUMINAL DEVICE, PERCUTANEOUS APPROACH: ICD-10-PCS | Performed by: INTERNAL MEDICINE

## 2024-05-08 PROCEDURE — B240ZZ3 ULTRASONOGRAPHY OF SINGLE CORONARY ARTERY, INTRAVASCULAR: ICD-10-PCS | Performed by: INTERNAL MEDICINE

## 2024-05-08 PROCEDURE — 93458 L HRT ARTERY/VENTRICLE ANGIO: CPT | Performed by: INTERNAL MEDICINE

## 2024-05-08 PROCEDURE — 94799 UNLISTED PULMONARY SVC/PX: CPT

## 2024-05-08 PROCEDURE — 84132 ASSAY OF SERUM POTASSIUM: CPT | Performed by: HOSPITALIST

## 2024-05-08 PROCEDURE — 82948 REAGENT STRIP/BLOOD GLUCOSE: CPT

## 2024-05-08 PROCEDURE — C1760 CLOSURE DEV, VASC: HCPCS | Performed by: INTERNAL MEDICINE

## 2024-05-08 PROCEDURE — 93306 TTE W/DOPPLER COMPLETE: CPT | Performed by: INTERNAL MEDICINE

## 2024-05-08 PROCEDURE — C1769 GUIDE WIRE: HCPCS | Performed by: INTERNAL MEDICINE

## 2024-05-08 PROCEDURE — 25010000002 HEPARIN (PORCINE) PER 1000 UNITS: Performed by: HOSPITALIST

## 2024-05-08 PROCEDURE — C1753 CATH, INTRAVAS ULTRASOUND: HCPCS | Performed by: INTERNAL MEDICINE

## 2024-05-08 PROCEDURE — 25810000003 SODIUM CHLORIDE 0.9 % SOLUTION: Performed by: INTERNAL MEDICINE

## 2024-05-08 PROCEDURE — 92978 ENDOLUMINL IVUS OCT C 1ST: CPT | Performed by: INTERNAL MEDICINE

## 2024-05-08 PROCEDURE — 25010000002 FENTANYL CITRATE (PF) 50 MCG/ML SOLUTION: Performed by: INTERNAL MEDICINE

## 2024-05-08 PROCEDURE — C1887 CATHETER, GUIDING: HCPCS | Performed by: INTERNAL MEDICINE

## 2024-05-08 PROCEDURE — 93005 ELECTROCARDIOGRAM TRACING: CPT | Performed by: INTERNAL MEDICINE

## 2024-05-08 PROCEDURE — 25010000002 MIDAZOLAM PER 1 MG: Performed by: INTERNAL MEDICINE

## 2024-05-08 PROCEDURE — 83036 HEMOGLOBIN GLYCOSYLATED A1C: CPT | Performed by: HOSPITALIST

## 2024-05-08 PROCEDURE — C1894 INTRO/SHEATH, NON-LASER: HCPCS | Performed by: INTERNAL MEDICINE

## 2024-05-08 PROCEDURE — 84443 ASSAY THYROID STIM HORMONE: CPT | Performed by: HOSPITALIST

## 2024-05-08 PROCEDURE — C1874 STENT, COATED/COV W/DEL SYS: HCPCS | Performed by: INTERNAL MEDICINE

## 2024-05-08 PROCEDURE — B2151ZZ FLUOROSCOPY OF LEFT HEART USING LOW OSMOLAR CONTRAST: ICD-10-PCS | Performed by: INTERNAL MEDICINE

## 2024-05-08 PROCEDURE — 25510000001 IOPAMIDOL PER 1 ML: Performed by: INTERNAL MEDICINE

## 2024-05-08 PROCEDURE — 4A023N7 MEASUREMENT OF CARDIAC SAMPLING AND PRESSURE, LEFT HEART, PERCUTANEOUS APPROACH: ICD-10-PCS | Performed by: INTERNAL MEDICINE

## 2024-05-08 PROCEDURE — B2111ZZ FLUOROSCOPY OF MULTIPLE CORONARY ARTERIES USING LOW OSMOLAR CONTRAST: ICD-10-PCS | Performed by: INTERNAL MEDICINE

## 2024-05-08 PROCEDURE — 99232 SBSQ HOSP IP/OBS MODERATE 35: CPT | Performed by: HOSPITALIST

## 2024-05-08 PROCEDURE — 80053 COMPREHEN METABOLIC PANEL: CPT | Performed by: HOSPITALIST

## 2024-05-08 PROCEDURE — C9600 PERC DRUG-EL COR STENT SING: HCPCS | Performed by: INTERNAL MEDICINE

## 2024-05-08 PROCEDURE — 25810000003 SODIUM CHLORIDE 0.9 % SOLUTION: Performed by: HOSPITALIST

## 2024-05-08 PROCEDURE — 80061 LIPID PANEL: CPT | Performed by: HOSPITALIST

## 2024-05-08 PROCEDURE — 63710000001 INSULIN LISPRO (HUMAN) PER 5 UNITS: Performed by: HOSPITALIST

## 2024-05-08 PROCEDURE — 85520 HEPARIN ASSAY: CPT | Performed by: HOSPITALIST

## 2024-05-08 PROCEDURE — 63710000001 INSULIN GLARGINE PER 5 UNITS: Performed by: HOSPITALIST

## 2024-05-08 PROCEDURE — 99254 IP/OBS CNSLTJ NEW/EST MOD 60: CPT | Performed by: INTERNAL MEDICINE

## 2024-05-08 PROCEDURE — 85025 COMPLETE CBC W/AUTO DIFF WBC: CPT | Performed by: HOSPITALIST

## 2024-05-08 PROCEDURE — C1725 CATH, TRANSLUMIN NON-LASER: HCPCS | Performed by: INTERNAL MEDICINE

## 2024-05-08 PROCEDURE — 25010000002 HEPARIN (PORCINE) PER 1000 UNITS: Performed by: INTERNAL MEDICINE

## 2024-05-08 PROCEDURE — 92928 PRQ TCAT PLMT NTRAC ST 1 LES: CPT | Performed by: INTERNAL MEDICINE

## 2024-05-08 DEVICE — XIENCE SKYPOINT™ EVEROLIMUS ELUTING CORONARY STENT SYSTEM 3.25 MM X 15 MM / RAPID-EXCHANGE
Type: IMPLANTABLE DEVICE | Site: CORONARY | Status: FUNCTIONAL
Brand: XIENCE SKYPOINT™

## 2024-05-08 RX ORDER — ACETAMINOPHEN 325 MG/1
650 TABLET ORAL EVERY 6 HOURS PRN
Status: DISCONTINUED | OUTPATIENT
Start: 2024-05-08 | End: 2024-05-09 | Stop reason: HOSPADM

## 2024-05-08 RX ORDER — POLYETHYLENE GLYCOL 3350 17 G/17G
17 POWDER, FOR SOLUTION ORAL DAILY PRN
Status: DISCONTINUED | OUTPATIENT
Start: 2024-05-08 | End: 2024-05-09 | Stop reason: HOSPADM

## 2024-05-08 RX ORDER — HEPARIN SODIUM 5000 [USP'U]/ML
50 INJECTION, SOLUTION INTRAVENOUS; SUBCUTANEOUS AS NEEDED
Status: DISCONTINUED | OUTPATIENT
Start: 2024-05-08 | End: 2024-05-08

## 2024-05-08 RX ORDER — SODIUM CHLORIDE 0.9 % (FLUSH) 0.9 %
10 SYRINGE (ML) INJECTION EVERY 12 HOURS SCHEDULED
Status: DISCONTINUED | OUTPATIENT
Start: 2024-05-08 | End: 2024-05-09 | Stop reason: HOSPADM

## 2024-05-08 RX ORDER — POTASSIUM CHLORIDE 7.45 MG/ML
10 INJECTION INTRAVENOUS
Status: DISCONTINUED | OUTPATIENT
Start: 2024-05-08 | End: 2024-05-08

## 2024-05-08 RX ORDER — CARVEDILOL 6.25 MG/1
6.25 TABLET ORAL 2 TIMES DAILY WITH MEALS
Status: DISCONTINUED | OUTPATIENT
Start: 2024-05-08 | End: 2024-05-09 | Stop reason: HOSPADM

## 2024-05-08 RX ORDER — SODIUM CHLORIDE 9 MG/ML
INJECTION, SOLUTION INTRAVENOUS
Status: COMPLETED | OUTPATIENT
Start: 2024-05-08 | End: 2024-05-08

## 2024-05-08 RX ORDER — LIDOCAINE HYDROCHLORIDE 20 MG/ML
INJECTION, SOLUTION INFILTRATION; PERINEURAL
Status: DISCONTINUED | OUTPATIENT
Start: 2024-05-08 | End: 2024-05-08 | Stop reason: HOSPADM

## 2024-05-08 RX ORDER — HEPARIN SODIUM 1000 [USP'U]/ML
INJECTION, SOLUTION INTRAVENOUS; SUBCUTANEOUS
Status: DISCONTINUED | OUTPATIENT
Start: 2024-05-08 | End: 2024-05-08 | Stop reason: HOSPADM

## 2024-05-08 RX ORDER — FENTANYL CITRATE 50 UG/ML
INJECTION, SOLUTION INTRAMUSCULAR; INTRAVENOUS
Status: DISCONTINUED | OUTPATIENT
Start: 2024-05-08 | End: 2024-05-08 | Stop reason: HOSPADM

## 2024-05-08 RX ORDER — ACETAMINOPHEN 325 MG/1
650 TABLET ORAL EVERY 4 HOURS PRN
Status: DISCONTINUED | OUTPATIENT
Start: 2024-05-08 | End: 2024-05-09 | Stop reason: HOSPADM

## 2024-05-08 RX ORDER — SODIUM CHLORIDE 9 MG/ML
1 INJECTION, SOLUTION INTRAVENOUS CONTINUOUS
Status: ACTIVE | OUTPATIENT
Start: 2024-05-08 | End: 2024-05-08

## 2024-05-08 RX ORDER — AMOXICILLIN 250 MG
2 CAPSULE ORAL 2 TIMES DAILY
Status: DISCONTINUED | OUTPATIENT
Start: 2024-05-08 | End: 2024-05-09 | Stop reason: HOSPADM

## 2024-05-08 RX ORDER — MIDAZOLAM HYDROCHLORIDE 1 MG/ML
INJECTION INTRAMUSCULAR; INTRAVENOUS
Status: DISCONTINUED | OUTPATIENT
Start: 2024-05-08 | End: 2024-05-08 | Stop reason: HOSPADM

## 2024-05-08 RX ORDER — BISACODYL 5 MG/1
5 TABLET, DELAYED RELEASE ORAL DAILY PRN
Status: DISCONTINUED | OUTPATIENT
Start: 2024-05-08 | End: 2024-05-09 | Stop reason: HOSPADM

## 2024-05-08 RX ORDER — INSULIN LISPRO 100 [IU]/ML
2-7 INJECTION, SOLUTION INTRAVENOUS; SUBCUTANEOUS
Status: DISCONTINUED | OUTPATIENT
Start: 2024-05-08 | End: 2024-05-09 | Stop reason: HOSPADM

## 2024-05-08 RX ORDER — SODIUM CHLORIDE 9 MG/ML
40 INJECTION, SOLUTION INTRAVENOUS AS NEEDED
Status: DISCONTINUED | OUTPATIENT
Start: 2024-05-08 | End: 2024-05-09 | Stop reason: HOSPADM

## 2024-05-08 RX ORDER — ATORVASTATIN CALCIUM 40 MG/1
80 TABLET, FILM COATED ORAL NIGHTLY
Status: DISCONTINUED | OUTPATIENT
Start: 2024-05-08 | End: 2024-05-09 | Stop reason: HOSPADM

## 2024-05-08 RX ORDER — BISACODYL 10 MG
10 SUPPOSITORY, RECTAL RECTAL DAILY PRN
Status: DISCONTINUED | OUTPATIENT
Start: 2024-05-08 | End: 2024-05-09 | Stop reason: HOSPADM

## 2024-05-08 RX ORDER — NITROGLYCERIN 0.4 MG/1
0.4 TABLET SUBLINGUAL
Status: DISCONTINUED | OUTPATIENT
Start: 2024-05-08 | End: 2024-05-09 | Stop reason: HOSPADM

## 2024-05-08 RX ORDER — FAMOTIDINE 20 MG/1
20 TABLET, FILM COATED ORAL
Status: DISCONTINUED | OUTPATIENT
Start: 2024-05-08 | End: 2024-05-09 | Stop reason: HOSPADM

## 2024-05-08 RX ORDER — NICOTINE POLACRILEX 4 MG
15 LOZENGE BUCCAL
Status: DISCONTINUED | OUTPATIENT
Start: 2024-05-08 | End: 2024-05-09 | Stop reason: HOSPADM

## 2024-05-08 RX ORDER — POTASSIUM CHLORIDE 20 MEQ/1
40 TABLET, EXTENDED RELEASE ORAL EVERY 4 HOURS
Status: COMPLETED | OUTPATIENT
Start: 2024-05-08 | End: 2024-05-08

## 2024-05-08 RX ORDER — ASPIRIN 81 MG/1
81 TABLET ORAL DAILY
Status: DISCONTINUED | OUTPATIENT
Start: 2024-05-08 | End: 2024-05-09 | Stop reason: HOSPADM

## 2024-05-08 RX ORDER — ASPIRIN 81 MG/1
81 TABLET ORAL DAILY
Status: DISCONTINUED | OUTPATIENT
Start: 2024-05-08 | End: 2024-05-08

## 2024-05-08 RX ORDER — SODIUM CHLORIDE 0.9 % (FLUSH) 0.9 %
10 SYRINGE (ML) INJECTION AS NEEDED
Status: DISCONTINUED | OUTPATIENT
Start: 2024-05-08 | End: 2024-05-09 | Stop reason: HOSPADM

## 2024-05-08 RX ORDER — GLUCAGON 1 MG/ML
1 KIT INJECTION
Status: DISCONTINUED | OUTPATIENT
Start: 2024-05-08 | End: 2024-05-09 | Stop reason: HOSPADM

## 2024-05-08 RX ORDER — NITROGLYCERIN 0.4 MG/1
0.4 TABLET SUBLINGUAL
Status: DISCONTINUED | OUTPATIENT
Start: 2024-05-08 | End: 2024-05-08

## 2024-05-08 RX ORDER — SODIUM CHLORIDE 9 MG/ML
75 INJECTION, SOLUTION INTRAVENOUS CONTINUOUS
Status: DISCONTINUED | OUTPATIENT
Start: 2024-05-08 | End: 2024-05-09 | Stop reason: HOSPADM

## 2024-05-08 RX ORDER — DEXTROSE MONOHYDRATE 25 G/50ML
25 INJECTION, SOLUTION INTRAVENOUS
Status: DISCONTINUED | OUTPATIENT
Start: 2024-05-08 | End: 2024-05-09 | Stop reason: HOSPADM

## 2024-05-08 RX ADMIN — CARVEDILOL 6.25 MG: 6.25 TABLET, FILM COATED ORAL at 08:57

## 2024-05-08 RX ADMIN — POTASSIUM CHLORIDE 40 MEQ: 1500 TABLET, EXTENDED RELEASE ORAL at 10:20

## 2024-05-08 RX ADMIN — SODIUM CHLORIDE 75 ML/HR: 9 INJECTION, SOLUTION INTRAVENOUS at 23:31

## 2024-05-08 RX ADMIN — NITROGLYCERIN 1 INCH: 20 OINTMENT TOPICAL at 00:25

## 2024-05-08 RX ADMIN — Medication 10 ML: at 20:58

## 2024-05-08 RX ADMIN — Medication 10 ML: at 08:57

## 2024-05-08 RX ADMIN — TICAGRELOR 90 MG: 90 TABLET ORAL at 23:17

## 2024-05-08 RX ADMIN — DOCUSATE SODIUM 50 MG AND SENNOSIDES 8.6 MG 2 TABLET: 8.6; 5 TABLET, FILM COATED ORAL at 20:56

## 2024-05-08 RX ADMIN — CARVEDILOL 6.25 MG: 6.25 TABLET, FILM COATED ORAL at 17:25

## 2024-05-08 RX ADMIN — INSULIN LISPRO 3 UNITS: 100 INJECTION, SOLUTION INTRAVENOUS; SUBCUTANEOUS at 17:25

## 2024-05-08 RX ADMIN — ASPIRIN 81 MG: 81 TABLET, COATED ORAL at 08:56

## 2024-05-08 RX ADMIN — FAMOTIDINE 20 MG: 20 TABLET, FILM COATED ORAL at 06:35

## 2024-05-08 RX ADMIN — ACETAMINOPHEN 650 MG: 325 TABLET ORAL at 06:07

## 2024-05-08 RX ADMIN — DOCUSATE SODIUM 50 MG AND SENNOSIDES 8.6 MG 2 TABLET: 8.6; 5 TABLET, FILM COATED ORAL at 08:57

## 2024-05-08 RX ADMIN — ATORVASTATIN CALCIUM 80 MG: 40 TABLET, FILM COATED ORAL at 20:56

## 2024-05-08 RX ADMIN — SODIUM CHLORIDE 75 ML/HR: 9 INJECTION, SOLUTION INTRAVENOUS at 04:10

## 2024-05-08 RX ADMIN — POTASSIUM CHLORIDE 40 MEQ: 1500 TABLET, EXTENDED RELEASE ORAL at 06:07

## 2024-05-08 RX ADMIN — INSULIN GLARGINE 5 UNITS: 100 INJECTION, SOLUTION SUBCUTANEOUS at 08:57

## 2024-05-08 RX ADMIN — NITROGLYCERIN 1 INCH: 20 OINTMENT TOPICAL at 06:07

## 2024-05-08 RX ADMIN — INSULIN LISPRO 3 UNITS: 100 INJECTION, SOLUTION INTRAVENOUS; SUBCUTANEOUS at 21:14

## 2024-05-08 RX ADMIN — SODIUM CHLORIDE 1 ML/KG/HR: 9 INJECTION, SOLUTION INTRAVENOUS at 16:13

## 2024-05-08 RX ADMIN — FAMOTIDINE 20 MG: 20 TABLET, FILM COATED ORAL at 17:25

## 2024-05-08 RX ADMIN — HEPARIN SODIUM 3800 UNITS: 5000 INJECTION INTRAVENOUS; SUBCUTANEOUS at 00:52

## 2024-05-08 NOTE — PAYOR COMM NOTE
"[facility]  NPI:[id number]    Utilization Review  Contact: Kezia Carrillo RN  Phone: [phone]  Fax:[phone]    INITIATE INPATIENT AUTHORIZATION  Liyah Beverly (64 y.o. Female)       Date of Birth   1960    Social Security Number       Address   [address] [place], [place] [address]    Home Phone   [phone]    MRN   [id number]       Sabianism   [facility]    Marital Status                               Admission Date   24    Admission Type   Emergency    Admitting Provider   Audrey Page MD    Attending Provider   Audrey Page MD    Department, Room/Bed   [facility] PROGRESS CARE, P219/1P       Discharge Date       Discharge Disposition       Discharge Destination                                 Attending Provider: Audrey Page MD    Allergies: No Known Allergies    Isolation: None   Infection: None   Code Status: CPR    Ht: 170.2 cm (67\")   Wt: 71.9 kg (158 lb 8.2 oz)    Admission Cmt: None   Principal Problem: NSTEMI (non-ST elevated myocardial infarction) [I21.4]                   Active Insurance as of 2024       Primary Coverage       Payor Plan Insurance Group Employer/Plan Group    [employer] [place] [employer] [place]        Payor Plan Address Payor Plan Phone Number Payor Plan Fax Number Effective Dates    [address]   2014 - None Entered    [facility] [address]         Subscriber Name Subscriber Birth Date Member ID       LIYAH BEVERLY 1960 [id number]                     Emergency Contacts        (Rel.) Home Phone Work Phone Mobile Phone    BRODIE BEVERLY (Spouse) [phone] -- --                 History & Physical        Audrey Page MD at 24 1740              [facility] HOSPITALIST HISTORY AND PHYSICAL    Patient Identification:  Name:  Liyah Beverly  Age:  64 y.o.  Sex:  female  :  1960  MRN:  [id number]   Visit Number:  [id number]  Admit " Date: 5/7/2024   Room number:  403/03  Primary Care Physician:  Naveed Granados DO     Chief complaint:    Chief Complaint   Patient presents with    Chest Pain       History of presenting illness:  64 y.o. female with history significant for DVT x 2 and PE, she was supposed to be on anticoagulation, in 2016 she self discontinued her medication, she has history of osteoarthritis, essential hypertension presented to emergency room with chief complaints of burning sensation of the chest.    As per patient started 3 days prior to admission described as burning sensation retrosternal radiating to the throat, she thought it is reflux she took Mylanta with some relief.  The next day it continued on and off sometimes associated with activity sometimes at rest.  She decided come to the emergency room yesterday but could not wait for the results of blood test she left.  During that time EKG left anterior fascicular block and LVH with mild elevation of the ST.  V1 V2..  As per patient at home her chest discomfort continued associated with pain on both shoulders and tingling of both hands.  She went her family doctor and was advised to come to the emergency room.  In the route she received nitroglycerin and aspirin which relieved her symptoms.    At the emergency room her blood pressure is 152/93 heart rate 81 O2 saturation was 92 to 95% EKG no ST elevation with LVH and left anterior fascicular block, initial troponin yesterday was 55 today it is 373, repeat is 413 with a delta of +40.  She is also noted to have glucose of 348, she denies history of diabetes.    Case was discussed with Dr. Rust and recommended to have the case and EKG reviewed by Dr. Cruz/interventional cardiology, Dr. Cruz recommended start the patient heparin drip beta-blocker aspirin and statins.  ---------------------------------------------------------------------------------------------------------------------   Review of Systems    Constitutional:  Negative for activity change, appetite change, chills, diaphoresis, fatigue, fever and unexpected weight change.   HENT: Negative.     Eyes: Negative.    Respiratory: Negative.     Cardiovascular:  Positive for chest pain. Negative for palpitations and leg swelling.   Gastrointestinal: Negative.    Endocrine: Negative.    Genitourinary: Negative.    Musculoskeletal: Negative.    Skin: Negative.    Allergic/Immunologic: Negative.    Neurological: Negative.    Hematological: Negative.    Psychiatric/Behavioral: Negative.         ---------------------------------------------------------------------------------------------------------------------   Past Medical History:   Diagnosis Date    Arthritis     Deep venous thrombosis     Hypertension     Pulmonary embolism      Past Surgical History:   Procedure Laterality Date    BREAST SURGERY      COLONOSCOPY      HYSTERECTOMY      INNER EAR SURGERY      OTHER SURGICAL HISTORY      diagnostic esophagogastroduodenoscopy    TUBAL ABDOMINAL LIGATION       Family History   Problem Relation Age of Onset    Heart disease Mother     Diabetes Mother     Diabetes Sister     Heart disease Brother     Diabetes Brother      Social History     Socioeconomic History    Marital status:    Tobacco Use    Smoking status: Former   Substance and Sexual Activity    Alcohol use: No    Drug use: No     ---------------------------------------------------------------------------------------------------------------------   Allergies:  Patient has no known allergies.  ---------------------------------------------------------------------------------------------------------------------   Prior to Admission Medications       Prescriptions Last Dose Informant Patient Reported? Taking?    atorvastatin (LIPITOR) 20 MG tablet   Yes No    Take  by mouth.    lisinopril-hydrochlorothiazide (PRINZIDE,ZESTORETIC) 20-12.5 MG per tablet   Yes No    Take  by mouth.    meloxicam (MOBIC) 7.5  MG tablet   Yes No    Take 7.5 mg by mouth daily.    pantoprazole (PROTONIX) 40 MG EC tablet   Yes No    Take  by mouth.    ranitidine (ZANTAC) 150 MG tablet   Yes No    Take  by mouth.    rivaroxaban (XARELTO) 20 MG tablet   Yes No    Take  by mouth.          ---------------------------------------------------------------------------------------------------------------------   Vital Signs:  Temp:  [98.6 °F (37 °C)] 98.6 °F (37 °C)  Heart Rate:  [81-98] 98  Resp:  [18] 18  BP: (152-171)/(88-97) 157/97    Mean Arterial Pressure (Non-Invasive) for the past 24 hrs (Last 3 readings):   Noninvasive MAP (mmHg)   05/07/24 1700 116   05/07/24 1615 118   05/07/24 1600 113     SpO2:  [92 %-95 %] 94 %  on   ;   Device (Oxygen Therapy): room air  Body mass index is 25.82 kg/m².    Wt Readings from Last 3 Encounters:   05/07/24 74.8 kg (164 lb 14.5 oz)   05/06/24 74.8 kg (165 lb)   05/12/16 99.6 kg (219 lb 9.6 oz)               ---------------------------------------------------------------------------------------------------------------------   Physical Exam:  Constitutional:  Well-developed and well-nourished.  No respiratory distress.      HENT:  Head: Normocephalic and atraumatic.  Mouth:  Moist mucous membranes.    Eyes:  Conjunctivae and EOM are normal.  Pupils are equal, round, and reactive to light.  No scleral icterus.  Neck:  Neck supple.  No JVD present.    Cardiovascular:  Normal rate, regular rhythm and normal heart sounds with no murmur.  Pulmonary/Chest:  No respiratory distress, no wheezes, no crackles, with normal breath sounds and good air movement.  Abdominal:  Soft.  Bowel sounds are normal.  No distension and no tenderness.   Musculoskeletal:  No edema, no tenderness, and no deformity.  No red or swollen joints anywhere.    Neurological:  Alert and oriented to person, place, and time.  No cranial nerve deficit.  No tongue deviation.  No facial droop.  No slurred speech.   Skin:  Skin is warm and dry.  No  "rash noted.  No pallor.   Peripheral vascular:  No edema and strong pulses on all 4 extremities.  Genitourinary:  ---------------------------------------------------------------------------------------------------------------------  EKG:                Telemetry: Sinus rhythm 86 bpm  I have personally looked at both the EKG and the telemetry strips.  --------------------------------------------------------------------------------------------------------------------  Results from last 7 days   Lab Units 05/07/24  1457 05/07/24  1242 05/06/24  1556   HSTROP T ng/L 413* 373* 55*     Results from last 7 days   Lab Units 05/07/24  1640 05/07/24  1242 05/06/24  1556   WBC 10*3/mm3  --  7.65 5.63   HEMOGLOBIN g/dL  --  14.2 13.6   HEMATOCRIT %  --  43.5 41.7   MCV fL  --  92.8 92.5   MCHC g/dL  --  32.6 32.6   PLATELETS 10*3/mm3  --  185 154   INR  1.08  --   --      Results from last 7 days   Lab Units 05/07/24  1242 05/06/24  1556   SODIUM mmol/L 136 136   POTASSIUM mmol/L 3.1* 3.0*   MAGNESIUM mg/dL 2.1  --    CHLORIDE mmol/L 101 99   CO2 mmol/L 24.8 23.6   BUN mg/dL 7* 7*   CREATININE mg/dL 0.72 0.82   CALCIUM mg/dL 9.6 9.7   GLUCOSE mg/dL 348* 380*   ALBUMIN g/dL 3.8 4.0   BILIRUBIN mg/dL 1.0 0.6   ALK PHOS U/L 145* 154*   AST (SGOT) U/L 62* 24   ALT (SGPT) U/L 20 17   Estimated Creatinine Clearance: 83.4 mL/min (by C-G formula based on SCr of 0.72 mg/dL).    No results found for: \"HGBA1C\", \"POCGLU\"  No results found for: \"AMMONIA\"    Results from last 7 days   Lab Units 05/07/24  1559   NITRITE UA  Negative     No results found for: \"BLOODCX\"No results found for: \"RESPCX\"No results found for: \"URINECX\"No results found for: \"WOUNDCX\"No results found for: \"BODYFLDCX\"No results found for: \"STOOLCX\"  pH No results found for: \"PHART\"   pO2 No results found for: \"PO2ART\"   pCO2 No results found for: \"VAC4MVC\"   HCO3 No results found for: \"FXD4XVY\"     I have personally looked at the labs and they are summarized " above.  ----------------------------------------------------------------------------------------------------------------------  Imaging Results (Last 24 Hours)       Procedure Component Value Units Date/Time    XR Chest 1 View [[id number]] Collected: 05/07/24 1341     Updated: 05/07/24 1343    Narrative:      XR CHEST 1 VW-     CLINICAL INDICATION: Chest Pain Protocol        COMPARISON: None immediately available      TECHNIQUE: Single frontal view of the chest.     FINDINGS:     LUNGS: Lungs are adequately aerated.      HEART AND MEDIASTINUM: Heart and mediastinal contours are unremarkable        SKELETON: Bony and soft tissue structures are unremarkable.             Impression:      No radiographic evidence of acute cardiac or pulmonary disease.           This report was finalized on 5/7/2024 1:41 PM by Dr. Saravanan Morales MD.             I have personally reviewed the radiology images and read the final radiology report.  ----------------------------------------------------------------------------------------------------------------------  Assessment and Plan:  -Chest pain non-STEMI type I  -History of essential hypertension  -History of hyperlipidemia  -History of DVT x 2 and 1 PE patient stopped taking anticoagulation 2016   -History of osteoarthritis  -Hyperglycemia    Case discussed with Dr. Cruz/interventional cardiology EKG reviewed recommended medication management at this time and heparin drip will keep the patient n.p.o. for possible cardiac cath in the morning, check 2D echo, stat lipid panel, TSH, monitor electrolytes.  Check A1c, Accu-Chek and sliding scale.    With regards to her history of multiple DVT and PE, patient was supposed to be on blood thinner for life but she discontinued it herself stating she does not think she needs it anymore.  Will continue heparin drip, she will be needing anticoagulation on discharge.    Plan outlined to patient together with her  and agreed, further  management may change as condition evolves.    Audrey Page MD  05/07/24  17:40 EDT    Electronically signed by Audrey Page MD at 05/07/24 1748          Emergency Department Notes        Kasia Cohen, RN at 05/07/24 1948          Report called to PAULY Lucio, PCU    Electronically signed by Kasia Cohen RN at 05/07/24 1951       Alysa Panda RN at 05/07/24 1607          Pt rounded on updated    Electronically signed by Alysa Panda RN at 05/07/24 1607       Vital Signs (last 2 days)       Date/Time Temp Temp src Pulse Resp BP Patient Position SpO2    05/08/24 0728 -- -- -- -- -- -- 97    05/08/24 0607 -- -- 95 19 153/89 -- 96    05/08/24 0400 99.1 (37.3) Oral 97 20 152/83 -- 94    05/08/24 0300 -- -- 101 15 147/85 -- 96    05/08/24 0200 -- -- 93 14 144/89 -- 95    05/08/24 0100 -- -- 98 20 158/93 -- 96    05/08/24 0025 -- -- 101 -- 148/83 -- --    05/08/24 0000 99.3 (37.4) Oral 93 14 148/83 -- --    05/07/24 2300 -- -- 96 20 148/89 -- 93    05/07/24 2100 -- -- 108 17 146/79 -- 96    05/07/24 2030 99.4 (37.4) Oral 101 22 143/92 -- 95    05/07/24 2015 -- -- 94 -- 141/87 -- 92    05/07/24 2000 -- -- 92 -- 142/88 -- 94    05/07/24 1949 -- -- 93 -- -- -- 95    05/07/24 1930 -- -- 89 -- 142/89 -- 94    05/07/24 1915 -- -- 98 -- 158/91 -- 95    05/07/24 1900 -- -- 91 -- 149/89 -- 95    05/07/24 1845 -- -- 95 -- 156/87 -- 95    05/07/24 1830 -- -- 97 -- 145/95 -- 96    05/07/24 1815 -- -- 91 -- 157/94 -- 95    05/07/24 1804 -- -- 90 -- 173/101 -- --    05/07/24 1730 -- -- 95 -- 164/98 -- 96    05/07/24 1715 -- -- 96 -- 159/96 -- 95    05/07/24 1700 -- -- 98 -- 157/97 -- 94    05/07/24 1615 -- -- 91 -- 159/94 -- 95    05/07/24 1600 -- -- 92 -- 152/93 -- 95    05/07/24 1545 -- -- 90 -- 153/90 -- 95    05/07/24 1533 -- -- -- -- 171/90 -- --    05/07/24 1345 -- -- 81 -- 155/90 -- 95    05/07/24 1336 -- -- 89 -- 171/88 -- --    05/07/24 1332 -- -- 92 -- -- -- 94    05/07/24 1240 98.6 (37) Oral  98 18 160/89 Lying 92          Facility-Administered Medications as of 5/8/2024   Medication Dose Route Frequency Provider Last Rate Last Admin    [COMPLETED] acetaminophen (TYLENOL) tablet 1,000 mg  1,000 mg Oral Once Daryn Ghassan VELAZCODO   1,000 mg at 05/07/24 1721    acetaminophen (TYLENOL) tablet 650 mg  650 mg Oral Q6H PRN Bishop Lopez MD   650 mg at 05/08/24 0607    aspirin chewable tablet 324 mg  324 mg Oral Once Uriah España, APRSIMÓN        aspirin EC tablet 81 mg  81 mg Oral Daily Audrey Page MD        atorvastatin (LIPITOR) tablet 80 mg  80 mg Oral Nightly Audrey Page MD        sennosides-docusate (PERICOLACE) 8.6-50 MG per tablet 2 tablet  2 tablet Oral BID Audrey Page MD        And    polyethylene glycol (MIRALAX) packet 17 g  17 g Oral Daily PRN Audrey Page MD        And    bisacodyl (DULCOLAX) EC tablet 5 mg  5 mg Oral Daily PRN Audrey Page MD        And    bisacodyl (DULCOLAX) suppository 10 mg  10 mg Rectal Daily PRN Audrey Page MD        Calcium Replacement - Follow Nurse / BPA Driven Protocol   Does not apply PRN Audrey Page MD        carvedilol (COREG) tablet 6.25 mg  6.25 mg Oral BID With Meals Audrey Page MD        dextrose (D50W) (25 g/50 mL) IV injection 25 g  25 g Intravenous Q15 Min PRN Audrey Page MD        dextrose (GLUTOSE) oral gel 15 g  15 g Oral Q15 Min PRN Audrey Page MD        famotidine (PEPCID) tablet 20 mg  20 mg Oral BID AC Audrey Page MD   20 mg at 05/08/24 0635    glucagon HCl (Diagnostic) injection 1 mg  1 mg Intramuscular Q15 Min PRN Audrey Page MD        heparin (porcine) 5000 UNIT/ML injection 3,800 Units  50 Units/kg Intravenous PRN Audrey Page MD   3,800 Units at 05/08/24 0052    [COMPLETED] heparin (porcine) 5000 UNIT/ML injection 4,000 Units  4,000 Units Intravenous Once Uriah España, APRN   4,000 Units at 05/07/24 1802    heparin 04377  units/250 mL (100 units/mL) in 0.45 % NaCl infusion  15 Units/kg/hr (Dosing Weight) Intravenous Titrated Audrey Page MD 11.22 mL/hr at 05/08/24 0054 15 Units/kg/hr at 05/08/24 0054    Insulin Lispro (humaLOG) injection 2-7 Units  2-7 Units Subcutaneous 4x Daily AC & at Bedtime Audrey Page MD        Magnesium Standard Dose Replacement - Follow Nurse / BPA Driven Protocol   Does not apply PRN Audrey Page MD        nitroglycerin (NITROSTAT) ointment 1 inch  1 inch Topical Q6H Uriah España APRN   1 inch at 05/08/24 0607    nitroglycerin (NITROSTAT) SL tablet 0.4 mg  0.4 mg Sublingual Q5 Min PRN Audrey Page MD        Pharmacy Consult - Pharmacy to dose   Does not apply Continuous Audrey Page MD        Pharmacy to Dose Heparin   Does not apply Continuous PRN Uriah España APRN        Phosphorus Replacement - Follow Nurse / BPA Driven Protocol   Does not apply PRN Audrey Page MD        [COMPLETED] potassium chloride (KLOR-CON M20) CR tablet 40 mEq  40 mEq Oral Once Uriah España APRN   40 mEq at 05/07/24 1336    potassium chloride (KLOR-CON M20) CR tablet 40 mEq  40 mEq Oral Q4H Audrey Page MD   40 mEq at 05/08/24 0607    Potassium Replacement - Follow Nurse / BPA Driven Protocol   Does not apply PRN Audrey Page MD        sodium chloride 0.9 % flush 10 mL  10 mL Intravenous PRN Uriah España APRN        sodium chloride 0.9 % flush 10 mL  10 mL Intravenous Q12H Audrey Page MD        sodium chloride 0.9 % flush 10 mL  10 mL Intravenous PRN Audrey Page MD        sodium chloride 0.9 % infusion 40 mL  40 mL Intravenous PRN Audrey Page MD        sodium chloride 0.9 % infusion  75 mL/hr Intravenous Continuous Audrey Page MD 75 mL/hr at 05/08/24 0410 75 mL/hr at 05/08/24 0410     Orders (all)        Start     Ordered    05/08/24 2100  atorvastatin (LIPITOR) tablet 80 mg  Nightly         05/08/24 1018     "05/08/24 1526  Potassium  Timed,   Status:  Canceled         05/08/24 0525    05/08/24 1442  Potassium  Timed         05/08/24 0541    05/08/24 0900  sodium chloride 0.9 % flush 10 mL  Every 12 Hours Scheduled         05/08/24 0348    05/08/24 0900  aspirin EC tablet 81 mg  Daily         05/08/24 0348    05/08/24 0900  sennosides-docusate (PERICOLACE) 8.6-50 MG per tablet 2 tablet  2 Times Daily        Placed in \"And\" Linked Group    05/08/24 0348    05/08/24 0800  Oral Care  2 Times Daily       05/08/24 0348    05/08/24 0800  carvedilol (COREG) tablet 6.25 mg  2 Times Daily With Meals         05/08/24 0348    05/08/24 0730  famotidine (PEPCID) tablet 20 mg  2 Times Daily Before Meals         05/08/24 0348    05/08/24 0730  Insulin Lispro (humaLOG) injection 2-7 Units  4 Times Daily Before Meals & Nightly         05/08/24 0348    05/08/24 0700  Heparin Anti-Xa  Timed         05/08/24 0032    05/08/24 0641  POC Glucose Once  PROCEDURE ONCE        Comments: Complete no more than 45 minutes prior to patient eating      05/08/24 0634    05/08/24 0630  potassium chloride (KLOR-CON M20) CR tablet 40 mEq  Every 4 Hours         05/08/24 0541    05/08/24 0615  potassium chloride 10 mEq in 100 mL IVPB  Every 1 Hour,   Status:  Discontinued         05/08/24 0525 05/08/24 0600  CBC & Differential  Every Third Day      Comments: Discontinue After Heparin Stopped      05/07/24 1634    05/08/24 0600  CBC & Differential  Morning Draw         05/08/24 0348    05/08/24 0600  Comprehensive Metabolic Panel  Morning Draw         05/08/24 0348    05/08/24 0600  POC Glucose Q6H  Every 6 Hours      Comments: Complete no more than 45 minutes prior to patient eating      05/08/24 0348    05/08/24 0600  CBC Auto Differential  PROCEDURE ONCE        Comments: Discontinue After Heparin Stopped      05/07/24 2202    05/08/24 0600  CBC Auto Differential  PROCEDURE ONCE         05/08/24 0348    05/08/24 0517  acetaminophen (TYLENOL) tablet 650 " mg  Every 6 Hours PRN         05/08/24 0517    05/08/24 0517  NPO Diet NPO Type: Sips with Meds  Diet Effective Midnight         05/08/24 0517    05/08/24 0445  Pharmacy Consult - Pharmacy to dose  Continuous         05/08/24 0348    05/08/24 0445  sodium chloride 0.9 % infusion  Continuous         05/08/24 0348    05/08/24 0400  Vital Signs  Every 4 Hours       05/08/24 0348    05/08/24 0349  Weigh Patient  Once         05/08/24 0348    05/08/24 0349  Insert Peripheral IV  Once         05/08/24 0348    05/08/24 0349  Saline Lock & Maintain IV Access  Continuous,   Status:  Canceled         05/08/24 0348    05/08/24 0349  VTE Prophylaxis Not Indicated: Other: Patient Currently Anticoagulated / Receiving Prophylaxis  Once         05/08/24 0348    05/08/24 0349  Continuous Cardiac Monitoring  Continuous        Comments: Follow Standing Orders As Outlined in Process Instructions (Open Order Report to View Full Instructions)    05/08/24 0348    05/08/24 0349  Maintain IV Access  Continuous         05/08/24 0348    05/08/24 0349  Telemetry - Place Orders & Notify Provider of Results When Patient Experiences Acute Chest Pain, Dysrhythmia or Respiratory Distress  Continuous        Comments: Open Order Report to View Parameters Requiring Provider Notification    05/08/24 0348    05/08/24 0349  May Be Off Telemetry for Tests  Continuous         05/08/24 0348    05/08/24 0349  Activity - Strict Bed Rest  Until Discontinued         05/08/24 0348    05/08/24 0349  NPO Diet NPO Type: Strict NPO  Diet Effective Midnight,   Status:  Canceled         05/08/24 0348    05/08/24 0349  Lipid Panel  STAT         05/08/24 0348    05/08/24 0349  Adult Transthoracic Echo Complete W/ Cont if Necessary Per Protocol  Once         05/08/24 0348    05/08/24 0349  Inpatient Cardiology Consult  Once        Specialty:  Cardiology  Provider:  (Not yet assigned)    05/08/24 0348    05/08/24 0349  TSH  STAT         05/08/24 0348    05/08/24 0349   "Hemoglobin A1c  STAT         05/08/24 0348    05/08/24 0348  Intake & Output  Every Shift       05/08/24 0348    05/08/24 0348  sodium chloride 0.9 % flush 10 mL  As Needed         05/08/24 0348    05/08/24 0348  sodium chloride 0.9 % infusion 40 mL  As Needed         05/08/24 0348    05/08/24 0348  nitroglycerin (NITROSTAT) SL tablet 0.4 mg  Every 5 Minutes PRN         05/08/24 0348    05/08/24 0348  Potassium Replacement - Follow Nurse / BPA Driven Protocol  As Needed         05/08/24 0348    05/08/24 0348  Magnesium Standard Dose Replacement - Follow Nurse / BPA Driven Protocol  As Needed         05/08/24 0348    05/08/24 0348  Phosphorus Replacement - Follow Nurse / BPA Driven Protocol  As Needed         05/08/24 0348    05/08/24 0348  Calcium Replacement - Follow Nurse / BPA Driven Protocol  As Needed         05/08/24 0348    05/08/24 0348  polyethylene glycol (MIRALAX) packet 17 g  Daily PRN        Placed in \"And\" Linked Group    05/08/24 0348    05/08/24 0348  bisacodyl (DULCOLAX) EC tablet 5 mg  Daily PRN        Placed in \"And\" Linked Group    05/08/24 0348    05/08/24 0348  bisacodyl (DULCOLAX) suppository 10 mg  Daily PRN        Placed in \"And\" Linked Group    05/08/24 0348    05/08/24 0348  dextrose (GLUTOSE) oral gel 15 g  Every 15 Minutes PRN         05/08/24 0348    05/08/24 0348  dextrose (D50W) (25 g/50 mL) IV injection 25 g  Every 15 Minutes PRN         05/08/24 0348    05/08/24 0348  glucagon HCl (Diagnostic) injection 1 mg  Every 15 Minutes PRN         05/08/24 0348    05/08/24 0113  Inpatient Nutrition Consult  Once        Provider:  (Not yet assigned)    05/08/24 0112    05/08/24 0058  POC Glucose Once  PROCEDURE ONCE        Comments: Complete no more than 45 minutes prior to patient eating      05/08/24 0051    05/08/24 0031  heparin (porcine) 5000 UNIT/ML injection 3,800 Units  As Needed         05/08/24 0032    05/07/24 2330  Heparin Anti-Xa  Timed         05/07/24 1702    05/07/24 1835  " Diet: Cardiac; Healthy Heart (2-3 Na+); No Straw; Texture: Regular (IDDSI 7); Fluid Consistency: Thin (IDDSI 0)  Diet Effective Now,   Status:  Canceled         05/07/24 1834    05/07/24 1738  Inpatient Admission  Once         05/07/24 1740    05/07/24 1738  Code Status and Medical Interventions:  Continuous         05/07/24 1740    05/07/24 1721  acetaminophen (TYLENOL) tablet 1,000 mg  Once         05/07/24 1705    05/07/24 1650  heparin (porcine) 5000 UNIT/ML injection 4,000 Units  Once         05/07/24 1634    05/07/24 1650  heparin 53398 units/250 mL (100 units/mL) in 0.45 % NaCl infusion  Titrated         05/07/24 1634    05/07/24 1633  heparin (porcine) 5000 UNIT/ML injection 3,800 Units  As Needed,   Status:  Discontinued         05/07/24 1634    05/07/24 1633  heparin (porcine) 5000 UNIT/ML injection 1,900 Units  As Needed,   Status:  Discontinued         05/07/24 1634    05/07/24 1633  Pharmacy to Dose Heparin  Continuous PRN         05/07/24 1634    05/07/24 1611  Initiate & Follow Heparin Protocol  Continuous         05/07/24 1634    05/07/24 1611  Notify Provider Platelet Count Less Than 17316  Continuous        Comments: Open Order Report to View Parameters Requiring Provider Notification    05/07/24 1634    05/07/24 1611  Stop Infusion & Notify Provider if Bleeding Occurs  Continuous        Comments: Open Order Report to View Parameters Requiring Provider Notification    05/07/24 1634    05/07/24 1611  Heparin Anti-Xa  STAT        Comments: Prior to Initial Heparin Bolus      05/07/24 1634    05/07/24 1611  Protime-INR  STAT        Comments: Prior to Initial Heparin Bolus      05/07/24 1634    05/07/24 1611  aPTT  STAT        Comments: Prior to Initial Heparin Bolus      05/07/24 1634    05/07/24 1609  ECG 12 Lead Chest Pain  STAT         05/07/24 1608    05/07/24 1559  Urinalysis With Culture If Indicated - Urine, Clean Catch  Once         05/07/24 1559    05/07/24 1442  High Sensitivity Troponin T  2Hr  PROCEDURE ONCE         05/07/24 1431    05/07/24 1314  aspirin chewable tablet 324 mg  Once         05/07/24 1258    05/07/24 1314  nitroglycerin (NITROSTAT) ointment 1 inch  Every 6 Hours Scheduled         05/07/24 1258    05/07/24 1314  potassium chloride (KLOR-CON M20) CR tablet 40 mEq  Once         05/07/24 1258    05/07/24 1306  CBC Auto Differential  Once         05/07/24 1305    05/07/24 1301  CBC & Differential  Once         05/07/24 1301    05/07/24 1301  Comprehensive Metabolic Panel  Once         05/07/24 1301    05/07/24 1301  Magnesium  Once         05/07/24 1301    05/07/24 1258  Magnesium  Once,   Status:  Canceled         05/07/24 1258    05/07/24 1257  CBC & Differential  Once,   Status:  Canceled         05/07/24 1258    05/07/24 1256  Cardiac Monitoring  Continuous,   Status:  Canceled        Comments: Follow Standing Orders As Outlined in Process Instructions (Open Order Report to View Full Instructions)    05/07/24 1258    05/07/24 1256  Continuous Pulse Oximetry  Continuous         05/07/24 1258    05/07/24 1256  Vital Signs  Per Hospital Policy         05/07/24 1258    05/07/24 1256  XR Chest 1 View  1 Time Imaging         05/07/24 1258    05/07/24 1256  Insert Peripheral IV  Once         05/07/24 1258    05/07/24 1256  [place] Draw  Once,   Status:  Canceled         05/07/24 1258    05/07/24 1256  Comprehensive Metabolic Panel  Once,   Status:  Canceled         05/07/24 1258    05/07/24 1256  Green Top (Gel)  PROCEDURE ONCE,   Status:  Canceled         05/07/24 1258    05/07/24 1256  Lavender Top  PROCEDURE ONCE,   Status:  Canceled         05/07/24 1258    05/07/24 1256  Gold Top - SST  PROCEDURE ONCE,   Status:  Canceled         05/07/24 1258    05/07/24 1256  Light Blue Top  PROCEDURE ONCE,   Status:  Canceled         05/07/24 1258    05/07/24 1255  sodium chloride 0.9 % flush 10 mL  As Needed         05/07/24 1258    05/07/24 1233  ECG 12 Lead Chest Pain  Once         05/07/24 1232     05/07/24 1233  High Sensitivity Troponin T  Once         05/07/24 1232    05/07/24 1233  [place] Draw  Once         05/07/24 1233    05/07/24 1233  Green Top (Gel)  PROCEDURE ONCE         05/07/24 1233    05/07/24 1233  Lavender Top  PROCEDURE ONCE         05/07/24 1233    05/07/24 1233  Gold Top - SST  PROCEDURE ONCE         05/07/24 1233    05/07/24 1233  Light Blue Top  PROCEDURE ONCE         05/07/24 1233    05/07/24 0000  Telemetry Scan  Once         05/07/24 0000    05/07/24 0000  Telemetry Scan  Once         05/07/24 0000    05/07/24 0000  Telemetry Scan  Once         05/07/24 0000    Unscheduled  Oxygen Therapy- Nasal Cannula; Titrate 1-6 LPM Per SpO2; 90 - 95%  Continuous PRN       05/07/24 1258    Unscheduled  ECG 12 Lead Chest Pain  As Needed      Comments: Persistent, Unrelieved or Recurrent Chest Pain    05/07/24 1258    Unscheduled  Follow Hypoglycemia Standing Orders For Blood Glucose <70 & Notify Provider of Treatment  As Needed      Comments: Follow Hypoglycemia Orders As Outlined in Process Instructions (Open Order Report to View Full Instructions)  Notify Provider Any Time Hypoglycemia Treatment is Administered    05/08/24 4819    --  acetaminophen (TYLENOL) 650 MG 8 hr tablet  2 Times Daily PRN         05/07/24 2311    Pending  acetaminophen (TYLENOL) tablet 1,325 mg  2 Times Daily PRN         Pending

## 2024-05-08 NOTE — CONSULTS
Consults  Date of Admit: 5/7/2024  Date of Consult: 05/08/24  No ref. provider found  Liyah Beverly  1960    Consulting Physician: Catherine Cruz MD    Cardiology consultation    Reason for consultation: Non-STEMI      Chest Pain   Associated symptoms include diaphoresis, nausea, vomiting and weakness. Pertinent negatives include no palpitations or shortness of breath.       Subjective     Chief Complaint   Patient presents with    Chest Pain       Liyah Beverly is a 64 y.o. female with hypertension, diabetes mellitus, recurrent DVT and pulmonary embolism, osteoarthritis, medical noncompliance, and former smoker.    05/04/2024 patient began to have substernal burning chest pain that radiated into her throat.  She took Mylanta and other antacid relief meds and had improvement in her symptoms.  They continue to wax and wane over the next few days but with increasing intensity.  05/06/2024 it no longer felt like heartburn and was not relieved by antacids.  The pain was radiating into both shoulders and arms.  She had associated nausea and vomited twice.  She also felt as though she was getting very hot and then very cold.  She came to the emergency room and was assessed, but she then left.  05/07/2024 to her primary care doctor.  The primary care doctor advised her that she needed to be at the ER and so she returned.    Admission vital signs significant for blood pressure 152/93.  High-sensitivity troponin -413.  CMP significant for hypokalemia at 3.0 and hyperglycemia at 380.  CBC unremarkable.  A1c returned at 9.8.  LDL elevated at 127.  Initial EKG revealed sinus tachycardia with ST elevation in V1 and V2 and T wave inversions.    She admits that she discontinued the anticoagulation she was prescribed because she did not think that she needed it anymore.  She discontinued metformin years ago because it gave her headache.  I discussed at length with her that if a stent is placed she would need to agreed  "to be compliant with dual antiplatelet therapy for at least 1 year in order to prevent in-stent restenosis.  She agrees to this treatment plan and says that she will do \"what ever it takes to not have another heart attack again.\"      Cardiac risk factors:diabetes mellitus and hypertension    Last Echo:      Last Stress:       Last Cath:      Past Medical History:   Diagnosis Date    Arthritis     Deep venous thrombosis     Hypertension     Pulmonary embolism      Past Surgical History:   Procedure Laterality Date    BREAST SURGERY      COLONOSCOPY      HYSTERECTOMY      INNER EAR SURGERY      INNER EAR SURGERY Right 1982    OTHER SURGICAL HISTORY      diagnostic esophagogastroduodenoscopy    TUBAL ABDOMINAL LIGATION       Family History   Problem Relation Age of Onset    Heart disease Mother     Diabetes Mother     Diabetes Sister     Heart disease Brother     Diabetes Brother      Social History     Tobacco Use    Smoking status: Former     Types: Cigarettes    Smokeless tobacco: Never   Vaping Use    Vaping status: Never Used   Substance Use Topics    Alcohol use: No    Drug use: No     Medications Prior to Admission   Medication Sig Dispense Refill Last Dose    acetaminophen (TYLENOL) 650 MG 8 hr tablet Take 2 tablets by mouth 2 (Two) Times a Day As Needed for Mild Pain.        Allergies:  Patient has no known allergies.    Review of Systems   Constitutional:  Positive for diaphoresis.   Respiratory:  Negative for shortness of breath.    Cardiovascular:  Positive for chest pain. Negative for palpitations and leg swelling.   Gastrointestinal:  Positive for nausea and vomiting.   Neurological:  Positive for weakness.          Objective      Vital Signs  Temp:  [98.6 °F (37 °C)-99.4 °F (37.4 °C)] 98.8 °F (37.1 °C)  Heart Rate:  [] 83  Resp:  [14-22] 22  BP: (116-173)/() 135/84  Body mass index is 24.83 kg/m².    Intake/Output Summary (Last 24 hours) at 5/8/2024 0955  Last data filed at 5/8/2024 " 0800  Gross per 24 hour   Intake 365.96 ml   Output --   Net 365.96 ml       Physical Exam  Vitals and nursing note reviewed. Exam conducted with a chaperone present ( and adult daughter at the bedside).   Constitutional:       General: She is not in acute distress.     Appearance: She is obese.   HENT:      Head: Normocephalic and atraumatic.   Neck:      Vascular: No carotid bruit.   Cardiovascular:      Rate and Rhythm: Normal rate and regular rhythm.      Pulses:           Posterior tibial pulses are 2+ on the right side and 2+ on the left side.      Heart sounds: No murmur heard.     No friction rub. No gallop.   Pulmonary:      Effort: Pulmonary effort is normal.      Breath sounds: Normal breath sounds.   Musculoskeletal:      Right lower leg: No edema.      Left lower leg: No edema.   Skin:     General: Skin is warm and dry.   Neurological:      General: No focal deficit present.      Mental Status: She is alert.   Psychiatric:         Mood and Affect: Mood normal.         Behavior: Behavior normal.         Telemetry: Sinus 70s    Results Review:   I reviewed the patient's new clinical results.  Results from last 7 days   Lab Units 05/07/24  1457 05/07/24  1242 05/06/24  1556   HSTROP T ng/L 413* 373* 55*     Results from last 7 days   Lab Units 05/08/24  0357 05/07/24  2352 05/07/24  1242 05/06/24  1556   WBC 10*3/mm3 8.92 8.40 7.65 5.63   HEMOGLOBIN g/dL 13.8 13.5 14.2 13.6   PLATELETS 10*3/mm3 161 155 185 154     Results from last 7 days   Lab Units 05/08/24  0357 05/07/24  1242 05/06/24  1556   SODIUM mmol/L 134* 136 136   POTASSIUM mmol/L 3.4* 3.1* 3.0*   CHLORIDE mmol/L 101 101 99   CO2 mmol/L 22.7 24.8 23.6   BUN mg/dL 7* 7* 7*   CREATININE mg/dL 0.61 0.72 0.82   CALCIUM mg/dL 9.0 9.6 9.7   GLUCOSE mg/dL 271* 348* 380*   ALT (SGPT) U/L 19 20 17   AST (SGOT) U/L 53* 62* 24     Lab Results   Component Value Date    INR 1.08 05/07/2024    INR 1.01 08/01/2014     Lab Results   Component Value Date  "   MG 2.1 05/07/2024     Lab Results   Component Value Date    TSH 1.150 05/08/2024    TRIG 98 05/08/2024    HDL 44 05/08/2024     (H) 05/08/2024      No results found for: \"PROBNP\"     EKG:             Imaging Results (Last 72 Hours)       Procedure Component Value Units Date/Time    XR Chest 1 View [[id number]] Collected: 05/07/24 1341     Updated: 05/07/24 1343    Narrative:      XR CHEST 1 VW-     CLINICAL INDICATION: Chest Pain Protocol        COMPARISON: None immediately available      TECHNIQUE: Single frontal view of the chest.     FINDINGS:     LUNGS: Lungs are adequately aerated.      HEART AND MEDIASTINUM: Heart and mediastinal contours are unremarkable        SKELETON: Bony and soft tissue structures are unremarkable.             Impression:      No radiographic evidence of acute cardiac or pulmonary disease.           This report was finalized on 5/7/2024 1:41 PM by Dr. Saravanan Morales MD.                Assessment:  1.  NSTEMI likely type I  2.  ACS  3.  Hypertension  4. Hyperlipidemia  5.  Uncontrolled diabetes  6.  History of medication noncompliance      Plan:  1.  Plan for invasive coronary angiogram today.  2.  Patient has been placed on heparin drip and initiated on beta-blocker and lipid management therapy  3.  At goal currently  4. high intensity statin initiated.  Will need to be monitored closely to achieve an LDL goal of less than 55 as she is very high risk per ACC definition and guidelines  5.  Managed by hospitalist  6.  Cautioned her that noncompliance with dual antiplatelet therapy would lead to in-stent restenosis which could be life-threatening.  She and her family verbalized understanding.    Risks and benefits of the procedure discussed with the patient and her significant others.  Risks specifically mentioned included bruising/hematoma, bleeding, infection, allergic reaction to medications, renal injury, dysrhythmia, blood clot, heart attack, and stroke.    Case discussed with " Dr. Cruz and plan of care reflects his recommendations    Thank you very much for asking us to be involved in this patient's care.  We will follow along with you.      Electronically signed by CORBY Goldberg, 05/08/24, 10:18 AM EDT.    Patient seen and examined in Cath Lab in the morning.  Chart reviewed.  Agree with nurse practitioners assessment and plan.  Patient has been on IV heparin.  Patient was pain-free at the time of examination.  Elevated cardiac enzymes with abnormal EKG septal infarction noted on EKG possible subacute MI.  Left heart cardiac catheterization done this morning and PCI to the LAD done.  Dual antiplatelet therapy to continue.  Lipid-lowering medications to continue.  Beta-blocker and ACE or ARB will be advised as tolerated.  .Electronically signed by Catherine Cruz MD, 05/08/24, 4:43 PM EDT.

## 2024-05-08 NOTE — PROGRESS NOTES
[facility] IN[facility]ION  Liyah Beverly is a  64 y.o. female receiving heparin infusion.     Therapy for (VTE/Cardiac):   Cardiac  Patient Dosing Weight: 74.8 kg  Initial Bolus (Y/N):   Y  Any Bolus (Y/N):   Y        Signs or Symptoms of Bleeding: N    Cardiac or Other (Not VTE)   Initial Bolus: 60 units/kg (Max 4,000 units)  Initial rate: 12 units/kg/hr (Max 1,000 units/hr)   Anti Xa Rebolus Infusion Hold time Change infusion Dose (Units/kg/hr) Next Anti Xa or aPTT Level Due   < 0.11 50 Units/kg  (4000 Units Max) None Increase by  3 Units/kg/hr 6 hours   0.11- 0.19 25 Units/kg  (2000 Units Max) None Increase by  2 Units/kg/hr 6 hours   0.2 - 0.29 0 None Increase by  1 Units/kg/hr 6 hours   0.3 - 0.5 0 None No Change 6 hours (after 2 consecutive levels in range check q24h @0700)   0.51 - 0.6 0 None Decrease by  1 Units/kg/hr 6 hours   0.61 - 0.8 0 30 Minutes Decrease by  2 Units/kg/hr 6 hours   0.81 - 1 0 60 Minutes Decrease by  3 Units/kg/hr 6 hours   >1 0 Hold  After Anti Xa less than 0.5 decrease previous rate by  4 Units/kg/hr  Every 2 hours until Anti Xa  less than 0.5 then when infusion restarts in 6 hours         Recommend anti-Xa every 6 hours.          Date   Time   Anti-Xa Current Rate (Unit/kg/hr) Bolus   (Units) Rate Change   (Unit/kg/hr) New Rate (Unit/kg/hr) Next   Anti-Xa Comments  Pump Check Daily   1640 1640 <0.1 - 4000 initial 12 2330 Discussed initial rate with nurse Kasia.    05/08 0035 0.1 12 3800 +3 15 0700 Increase rate plus bolus, no s/s bleeding per PALUY Lucio    5/8 0800 0.33 15 - 0 15 1300 Tx x 1. Spoke with PAULY Iglesias. No s/s bleeding. Continue same.                                                                                                                                                                                                             Pharmacy will continue to follow anti-Xa results and monitor for signs and symptoms of bleeding or thrombosis.    Thank you.  Yasemin Pack,  Pharm.D.  5/8/2024  08:09 EDT

## 2024-05-08 NOTE — PLAN OF CARE
Problem: Adult Inpatient Plan of Care  Goal: Plan of Care Review  Outcome: Ongoing, Progressing  Goal: Patient-Specific Goal (Individualized)  Outcome: Ongoing, Progressing  Goal: Absence of Hospital-Acquired Illness or Injury  Outcome: Ongoing, Progressing  Intervention: Identify and Manage Fall Risk  Recent Flowsheet Documentation  Taken 5/8/2024 1500 by Kelsie Mari RN  Safety Promotion/Fall Prevention:   activity supervised   assistive device/personal items within reach   clutter free environment maintained   fall prevention program maintained   nonskid shoes/slippers when out of bed   room organization consistent   safety round/check completed  Taken 5/8/2024 1300 by Kelsie Mari RN  Safety Promotion/Fall Prevention:   activity supervised   assistive device/personal items within reach   clutter free environment maintained   fall prevention program maintained   nonskid shoes/slippers when out of bed   room organization consistent   safety round/check completed  Taken 5/8/2024 1100 by Kelsie Mari RN  Safety Promotion/Fall Prevention:   activity supervised   assistive device/personal items within reach   clutter free environment maintained   fall prevention program maintained   nonskid shoes/slippers when out of bed   room organization consistent   safety round/check completed  Taken 5/8/2024 0930 by Kelsie Mari RN  Safety Promotion/Fall Prevention:   activity supervised   assistive device/personal items within reach   clutter free environment maintained   fall prevention program maintained   nonskid shoes/slippers when out of bed   room organization consistent   safety round/check completed  Taken 5/8/2024 0900 by Kelsie Mari RN  Safety Promotion/Fall Prevention:   activity supervised   assistive device/personal items within reach   clutter free environment maintained   fall prevention program maintained   nonskid shoes/slippers when out of bed   room organization consistent   safety  round/check completed  Taken 5/8/2024 0700 by Kelsie Mari RN  Safety Promotion/Fall Prevention:   activity supervised   assistive device/personal items within reach   clutter free environment maintained   fall prevention program maintained   nonskid shoes/slippers when out of bed   room organization consistent   safety round/check completed  Intervention: Prevent and Manage VTE (Venous Thromboembolism) Risk  Recent Flowsheet Documentation  Taken 5/8/2024 0930 by Kelsie Mari RN  Activity Management: activity encouraged  Intervention: Prevent Infection  Recent Flowsheet Documentation  Taken 5/8/2024 1500 by Kelsie Mari RN  Infection Prevention:   rest/sleep promoted   single patient room provided  Taken 5/8/2024 1300 by Kelsie Mari RN  Infection Prevention:   rest/sleep promoted   single patient room provided  Taken 5/8/2024 1100 by Kelsie Mari RN  Infection Prevention:   rest/sleep promoted   single patient room provided  Taken 5/8/2024 0930 by Kelsie Mari RN  Infection Prevention:   rest/sleep promoted   single patient room provided  Taken 5/8/2024 0900 by Kelsie Mari RN  Infection Prevention:   rest/sleep promoted   single patient room provided  Taken 5/8/2024 0700 by Klesie Mari RN  Infection Prevention:   rest/sleep promoted   single patient room provided  Goal: Optimal Comfort and Wellbeing  Outcome: Ongoing, Progressing  Intervention: Provide Person-Centered Care  Recent Flowsheet Documentation  Taken 5/8/2024 1300 by Kelsie Mari RN  Trust Relationship/Rapport:   care explained   choices provided   emotional support provided   empathic listening provided   questions answered   questions encouraged   reassurance provided   thoughts/feelings acknowledged  Taken 5/8/2024 0930 by Kelsie Mari RN  Trust Relationship/Rapport:   care explained   choices provided   emotional support provided   empathic listening provided   questions answered   questions encouraged   reassurance  provided   thoughts/feelings acknowledged  Goal: Readiness for Transition of Care  Outcome: Ongoing, Progressing     Problem: Chest Pain  Goal: Resolution of Chest Pain Symptoms  Outcome: Ongoing, Progressing     Problem: Fall Injury Risk  Goal: Absence of Fall and Fall-Related Injury  Outcome: Ongoing, Progressing  Intervention: Identify and Manage Contributors  Recent Flowsheet Documentation  Taken 5/8/2024 1500 by Kelsie Mari RN  Medication Review/Management: medications reviewed  Taken 5/8/2024 1300 by Kelsie Mari RN  Medication Review/Management: medications reviewed  Taken 5/8/2024 1100 by Kelsie Mari RN  Medication Review/Management: medications reviewed  Taken 5/8/2024 0930 by Kelsie Mari RN  Medication Review/Management: medications reviewed  Taken 5/8/2024 0900 by Kelsie Mari RN  Medication Review/Management: medications reviewed  Taken 5/8/2024 0700 by Kelsie Mari RN  Medication Review/Management: medications reviewed  Intervention: Promote Injury-Free Environment  Recent Flowsheet Documentation  Taken 5/8/2024 1500 by Kelsie Mari RN  Safety Promotion/Fall Prevention:   activity supervised   assistive device/personal items within reach   clutter free environment maintained   fall prevention program maintained   nonskid shoes/slippers when out of bed   room organization consistent   safety round/check completed  Taken 5/8/2024 1300 by Kelsie Mari RN  Safety Promotion/Fall Prevention:   activity supervised   assistive device/personal items within reach   clutter free environment maintained   fall prevention program maintained   nonskid shoes/slippers when out of bed   room organization consistent   safety round/check completed  Taken 5/8/2024 1100 by Kelsie Mari RN  Safety Promotion/Fall Prevention:   activity supervised   assistive device/personal items within reach   clutter free environment maintained   fall prevention program maintained   nonskid shoes/slippers when  out of bed   room organization consistent   safety round/check completed  Taken 5/8/2024 0930 by Kelsie Mari RN  Safety Promotion/Fall Prevention:   activity supervised   assistive device/personal items within reach   clutter free environment maintained   fall prevention program maintained   nonskid shoes/slippers when out of bed   room organization consistent   safety round/check completed  Taken 5/8/2024 0900 by Kelsie Mari RN  Safety Promotion/Fall Prevention:   activity supervised   assistive device/personal items within reach   clutter free environment maintained   fall prevention program maintained   nonskid shoes/slippers when out of bed   room organization consistent   safety round/check completed  Taken 5/8/2024 0700 by Kelsie Mari RN  Safety Promotion/Fall Prevention:   activity supervised   assistive device/personal items within reach   clutter free environment maintained   fall prevention program maintained   nonskid shoes/slippers when out of bed   room organization consistent   safety round/check completed   Goal Outcome Evaluation:   Pt alert and oriented x 4. VSS and afebrile this shift. Pt on room air. Pt went down for a heart cath today. Site is clean, dry, and intact a this time. Pt tolerating well. No complaints at this time. Bed is locked in low position with call light in reach.

## 2024-05-08 NOTE — PLAN OF CARE
Goal Outcome Evaluation:  Plan of Care Reviewed With: patient        Progress: no change  Outcome Evaluation: Patient admitted onto pcu this shift, VSS, AO on RA. Patient has heparin gtt infusing per orders and NS at 75ml/hr. Patient NPO for possible cath in AM. Patient resting in bed at this time with no complaints.      Problem: Adult Inpatient Plan of Care  Goal: Plan of Care Review  5/8/2024 0447 by Naveed Shaikh RN  Outcome: Ongoing, Progressing  Flowsheets (Taken 5/8/2024 0447)  Progress: no change  Plan of Care Reviewed With: patient  Outcome Evaluation: Patient admitted onto pcu this shift, VSS, AO on RA. Patient has heparin gtt infusing per orders and NS at 75ml/hr. Patient NPO for possible cath in AM. Patient resting in bed at this time with no complaints.  5/8/2024 0106 by Naveed Shaikh RN  Outcome: Ongoing, Progressing  Flowsheets (Taken 5/8/2024 0106)  Plan of Care Reviewed With: patient  Goal: Patient-Specific Goal (Individualized)  5/8/2024 0447 by Naveed Shaikh RN  Outcome: Ongoing, Progressing  5/8/2024 0106 by Naveed Shaikh RN  Outcome: Ongoing, Progressing  Goal: Absence of Hospital-Acquired Illness or Injury  5/8/2024 0447 by Naveed Shaikh RN  Outcome: Ongoing, Progressing  5/8/2024 0106 by Naveed Shaikh RN  Outcome: Ongoing, Progressing  Intervention: Identify and Manage Fall Risk  Recent Flowsheet Documentation  Taken 5/8/2024 0300 by Naveed Shaikh RN  Safety Promotion/Fall Prevention: safety round/check completed  Taken 5/8/2024 0235 by Naveed Shaikh RN  Safety Promotion/Fall Prevention: safety round/check completed  Taken 5/8/2024 0100 by Naveed Shaikh RN  Safety Promotion/Fall Prevention: safety round/check completed  Taken 5/7/2024 2300 by Naveed Shaikh RN  Safety Promotion/Fall Prevention: safety round/check completed  Taken 5/7/2024 2100 by Naveed Shaikh RN  Safety Promotion/Fall Prevention: safety round/check completed  Taken 5/7/2024 2030 by  Lefevers, Naveed, RN  Safety Promotion/Fall Prevention: safety round/check completed  Intervention: Prevent Skin Injury  Recent Flowsheet Documentation  Taken 5/8/2024 0235 by Naveed Shaikh RN  Skin Protection:   adhesive use limited   incontinence pads utilized   protective footwear used   pulse oximeter probe site changed   skin-to-skin areas padded   tubing/devices free from skin contact   transparent dressing maintained  Intervention: Prevent and Manage VTE (Venous Thromboembolism) Risk  Recent Flowsheet Documentation  Taken 5/8/2024 0235 by Naveed Shaikh RN  VTE Prevention/Management: (Heparin gtt) other (see comments)  Range of Motion: active ROM (range of motion) encouraged  Taken 5/7/2024 2030 by Naveed Shaikh RN  VTE Prevention/Management: (Heparin gtt) other (see comments)  Intervention: Prevent Infection  Recent Flowsheet Documentation  Taken 5/8/2024 0300 by Naveed Shaikh RN  Infection Prevention:   rest/sleep promoted   single patient room provided  Taken 5/8/2024 0235 by Naveed Shaikh RN  Infection Prevention:   single patient room provided   rest/sleep promoted  Taken 5/8/2024 0100 by Naveed Shaikh RN  Infection Prevention:   single patient room provided   rest/sleep promoted  Taken 5/7/2024 2300 by Naveed Shaikh RN  Infection Prevention:   rest/sleep promoted   single patient room provided  Taken 5/7/2024 2100 by Naveed Shaikh RN  Infection Prevention:   rest/sleep promoted   single patient room provided  Goal: Optimal Comfort and Wellbeing  5/8/2024 0447 by Naveed Shaikh RN  Outcome: Ongoing, Progressing  5/8/2024 0106 by Naveed Shaikh RN  Outcome: Ongoing, Progressing  Intervention: Provide Person-Centered Care  Recent Flowsheet Documentation  Taken 5/8/2024 0235 by Naveed Shaikh RN  Trust Relationship/Rapport:   care explained   choices provided   reassurance provided   thoughts/feelings acknowledged  Taken 5/7/2024 2030 by Naveed Shaikh RN  Trust  Relationship/Rapport:   choices provided   care explained   thoughts/feelings acknowledged  Goal: Readiness for Transition of Care  5/8/2024 0447 by Naveed Shaikh, RN  Outcome: Ongoing, Progressing  5/8/2024 0106 by Naveed Shaikh, RN  Outcome: Ongoing, Progressing  Intervention: Mutually Develop Transition Plan  Recent Flowsheet Documentation  Taken 5/8/2024 0111 by Naveed Shaikh, RN  Transportation Anticipated: family or friend will provide  Transportation Concerns: none  Patient/Family Anticipated Services at Transition: none  Patient/Family Anticipates Transition to: home  Taken 5/8/2024 0106 by Naveed Shaikh, RN  Equipment Currently Used at Home: none     Problem: Chest Pain  Goal: Resolution of Chest Pain Symptoms  Outcome: Ongoing, Progressing

## 2024-05-08 NOTE — PROGRESS NOTES
"    [facility] HOSPITALIST PROGRESS NOTE     Patient Identification:  Name:  Liyah Beverly  Age:  64 y.o.  Sex:  female  :  1960  MRN:  [id number]  Visit Number:  [id number]  Primary Care Provider:  Naveed Granados DO    Length of stay:  1    Subjective: Patient seen and examined, since last night no recurrence of chest discomfort, patient's A1c is more than 9, when questioned patient confirmed that she has been diabetic but she stopped 2 years ago taking Glucophage because \"it gives her headache \".  Based on multiple instances regarding her medications were multiple underlying condition patient appears to be significantly noncompliant.    Chief Complaint: Non-STEMI type I  ----------------------------------------------------------------------------------------------------------------------  Current Hospital Meds:  aspirin, 324 mg, Oral, Once  aspirin, 81 mg, Oral, Daily  atorvastatin, 80 mg, Oral, Nightly  carvedilol, 6.25 mg, Oral, BID With Meals  famotidine, 20 mg, Oral, BID AC  [START ON 2024] insulin glargine, 10 Units, Subcutaneous, Daily  insulin lispro, 2-7 Units, Subcutaneous, 4x Daily AC & at Bedtime  nitroglycerin, 1 inch, Topical, Q6H  potassium chloride ER, 40 mEq, Oral, Q4H  senna-docusate sodium, 2 tablet, Oral, BID  sodium chloride, 10 mL, Intravenous, Q12H      heparin, 15 Units/kg/hr (Dosing Weight), Last Rate: 15 Units/kg/hr (24)  Pharmacy Consult - Pharmacy to dose,   Pharmacy to Dose Heparin,   sodium chloride, 75 mL/hr, Last Rate: 75 mL/hr (24 485)      ----------------------------------------------------------------------------------------------------------------------  Vital Signs:  Temp:  [98.6 °F (37 °C)-99.4 °F (37.4 °C)] 98.8 °F (37.1 °C)  Heart Rate:  [] 83  Resp:  [14] 22  BP: (116-173)/() 135/84       Tele: Sinus rhythm 83 bpm      24  1240 24  2030 24  0400   Weight: 74.8 kg (164 lb 14.5 oz) 74.5 kg " (164 lb 3.9 oz) 71.9 kg (158 lb 8.2 oz)     Body mass index is 24.83 kg/m².    Intake/Output Summary (Last 24 hours) at 5/8/2024 0943  Last data filed at 5/8/2024 0800  Gross per 24 hour   Intake 365.96 ml   Output --   Net 365.96 ml     NPO Diet NPO Type: Sips with Meds  ----------------------------------------------------------------------------------------------------------------------  Physical exam:  General: Comfortable,awake, alert, oriented to self, place, and time, well-developed and well-nourished.  No respiratory distress.    Skin:  Skin is warm and dry. No rash noted. No pallor.    HENT:  Head:  Normocephalic and atraumatic.  Mouth:  Moist mucous membranes.    Eyes:  Conjunctivae and EOM are normal.  Pupils are equal, round, and reactive to light.  No scleral icterus.    Neck:  Neck supple.  No JVD present.    Pulmonary/Chest:  No respiratory distress, no wheezes, no crackles, with normal breath sounds and good air movement.  Cardiovascular:  Normal rate, regular rhythm and normal heart sounds with no murmur.  Abdominal:  Soft.  Bowel sounds are normal.  No distension and no tenderness.   Extremities:  No edema, no tenderness, and no deformity.  No red or swollen joints anywhere.  Strong pulses in all 4 extremities with no clubbing, no cyanosis, no edema.  Neurological:  Motor strength equal no obvious deficit, sensory grossly intact.   No cranial nerve deficit.  No tongue deviation.  No facial droop.  No slurred speech.    Genitourinary: No Smith catheter  Back:  ----------------------------------------------------------------------------------------------------------------------  ----------------------------------------------------------------------------------------------------------------------  Results from last 7 days   Lab Units 05/07/24  1457 05/07/24  1242 05/06/24  1556   HSTROP T ng/L 413* 373* 55*     Results from last 7 days   Lab Units 05/08/24  0357 05/07/24  2352 05/07/24  1640  "05/07/24  1242   WBC 10*3/mm3 8.92 8.40  --  7.65   HEMOGLOBIN g/dL 13.8 13.5  --  14.2   HEMATOCRIT % 42.9 41.7  --  43.5   MCV fL 94.1 93.9  --  92.8   MCHC g/dL 32.2 32.4  --  32.6   PLATELETS 10*3/mm3 161 155  --  185   INR   --   --  1.08  --          Results from last 7 days   Lab Units 05/08/24  0357 05/07/24  1242 05/06/24  1556   SODIUM mmol/L 134* 136 136   POTASSIUM mmol/L 3.4* 3.1* 3.0*   MAGNESIUM mg/dL  --  2.1  --    CHLORIDE mmol/L 101 101 99   CO2 mmol/L 22.7 24.8 23.6   BUN mg/dL 7* 7* 7*   CREATININE mg/dL 0.61 0.72 0.82   CALCIUM mg/dL 9.0 9.6 9.7   GLUCOSE mg/dL 271* 348* 380*   ALBUMIN g/dL 3.6 3.8 4.0   BILIRUBIN mg/dL 1.8* 1.0 0.6   ALK PHOS U/L 173* 145* 154*   AST (SGOT) U/L 53* 62* 24   ALT (SGPT) U/L 19 20 17   Estimated Creatinine Clearance: 105.8 mL/min (by C-G formula based on SCr of 0.61 mg/dL).    No results found for: \"AMMONIA\"  Results from last 7 days   Lab Units 05/08/24  0357   CHOLESTEROL mg/dL 189   TRIGLYCERIDES mg/dL 98   HDL CHOL mg/dL 44   LDL CHOL mg/dL 127*     No results found for: \"BLOODCX\"  No results found for: \"URINECX\"  No results found for: \"WOUNDCX\"  No results found for: \"STOOLCX\"    I have personally looked at the labs and they are summarized above.  ----------------------------------------------------------------------------------------------------------------------  Imaging Results (Last 24 Hours)       Procedure Component Value Units Date/Time    XR Chest 1 View [[id number]] Collected: 05/07/24 1341     Updated: 05/07/24 1343    Narrative:      XR CHEST 1 VW-     CLINICAL INDICATION: Chest Pain Protocol        COMPARISON: None immediately available      TECHNIQUE: Single frontal view of the chest.     FINDINGS:     LUNGS: Lungs are adequately aerated.      HEART AND MEDIASTINUM: Heart and mediastinal contours are unremarkable        SKELETON: Bony and soft tissue structures are unremarkable.             Impression:      No radiographic evidence of acute " cardiac or pulmonary disease.           This report was finalized on 5/7/2024 1:41 PM by Dr. Saravanan Morales MD.             ----------------------------------------------------------------------------------------------------------------------  Assessment and Plan:  -Acute non-STEMI  -History of diabetes with hyperglycemia noncompliant with medication  -History of recurrent DVT and PE noncompliant with anticoagulation self discontinued Xarelto after 2016  -Central hypertension  -Hyperlipidemia  -History of osteoarthritis    Patient started on Lantus, diabetic diet, statins, aspirin heparin drip, for H&H and platelets stable.  Discussion with family including patient and  the importance of strict medical compliance specially with her multiple underlying condition which can potentially cause serious complication if not treated properly.  Cardiology has been updated regarding the the finding of diabetes and PE and DVT in the past.  In the meantime awaiting formal cardiology recommendation and 2D echo report.    Plan outlined to patient together with her  and agreed, further management may change as condition evolves.      Disposition pending      Audrey Page MD  05/08/24  09:43 EDT

## 2024-05-08 NOTE — CASE MANAGEMENT/SOCIAL WORK
Continued Stay Note  SEBLE Mcdonald     Patient Name: Liyah Beverly  MRN: [id number]  Today's Date: 5/8/2024    Admit Date: 5/7/2024    Plan: ED CM completed initial assessment.  Patient plans to return home @ D/C with .  No HH or DME needed at this time.  Family will provide transportation at discharge.  No other issues or concerns are noted at this time.  CM will continue to follow and assist with any discharge needs.   Discharge Plan       Row Name 05/08/24 1550       Plan    Plan ED CM completed initial assessment.  Patient plans to return home @ D/C with .  No HH or DME needed at this time.  Family will provide transportation at discharge.  No other issues or concerns are noted at this time.  CM will continue to follow and assist with any discharge needs.    Plan Comments 5/8:  To ED from PCP office for chest discomfort like heartburn off & on X several day, Pain was more anterior & was pressure with pain down B arms & tingling in fingertips, Took Mylanta with some relief, next day continued, came to ED but left AMA 2/2 couldn't wait for results, went to PCP next day and they advised to come back to ED, Pt discontinued her anticoagulation herself in 2016 because she does not think she needs it anymore, D/C'd Metformin years ago because it gave her a HA, Pt initiated on Heparin gtt, Cath today with Stent placed to LAD, Pt agrees to medical compliance-KLS                   Discharge Codes    No documentation.                 Expected Discharge Date and Time       Expected Discharge Date Expected Discharge Time    May 10, 2024               Tyra Kyle RN

## 2024-05-09 ENCOUNTER — READMISSION MANAGEMENT (OUTPATIENT)
Dept: CALL CENTER | Facility: HOSPITAL | Age: 64
End: 2024-05-09
Payer: COMMERCIAL

## 2024-05-09 VITALS
DIASTOLIC BLOOD PRESSURE: 66 MMHG | WEIGHT: 158.51 LBS | HEART RATE: 75 BPM | BODY MASS INDEX: 24.88 KG/M2 | SYSTOLIC BLOOD PRESSURE: 113 MMHG | OXYGEN SATURATION: 98 % | TEMPERATURE: 99.2 F | HEIGHT: 67 IN | RESPIRATION RATE: 19 BRPM

## 2024-05-09 LAB
ANION GAP SERPL CALCULATED.3IONS-SCNC: 6.8 MMOL/L (ref 5–15)
BASOPHILS # BLD AUTO: 0.02 10*3/MM3 (ref 0–0.2)
BASOPHILS NFR BLD AUTO: 0.3 % (ref 0–1.5)
BUN SERPL-MCNC: 13 MG/DL (ref 8–23)
BUN/CREAT SERPL: 21.3 (ref 7–25)
CALCIUM SPEC-SCNC: 8.2 MG/DL (ref 8.6–10.5)
CHLORIDE SERPL-SCNC: 109 MMOL/L (ref 98–107)
CHOLEST SERPL-MCNC: 152 MG/DL (ref 0–200)
CO2 SERPL-SCNC: 22.2 MMOL/L (ref 22–29)
CREAT SERPL-MCNC: 0.61 MG/DL (ref 0.57–1)
DEPRECATED RDW RBC AUTO: 44.2 FL (ref 37–54)
EGFRCR SERPLBLD CKD-EPI 2021: 100 ML/MIN/1.73
EOSINOPHIL # BLD AUTO: 0.03 10*3/MM3 (ref 0–0.4)
EOSINOPHIL NFR BLD AUTO: 0.4 % (ref 0.3–6.2)
ERYTHROCYTE [DISTWIDTH] IN BLOOD BY AUTOMATED COUNT: 12.2 % (ref 12.3–15.4)
GLUCOSE BLDC GLUCOMTR-MCNC: 135 MG/DL (ref 70–130)
GLUCOSE BLDC GLUCOMTR-MCNC: 152 MG/DL (ref 70–130)
GLUCOSE SERPL-MCNC: 156 MG/DL (ref 65–99)
HCT VFR BLD AUTO: 39.1 % (ref 34–46.6)
HDLC SERPL-MCNC: 35 MG/DL (ref 40–60)
HGB BLD-MCNC: 12.1 G/DL (ref 12–15.9)
IMM GRANULOCYTES # BLD AUTO: 0.03 10*3/MM3 (ref 0–0.05)
IMM GRANULOCYTES NFR BLD AUTO: 0.4 % (ref 0–0.5)
LDLC SERPL CALC-MCNC: 93 MG/DL (ref 0–100)
LDLC/HDLC SERPL: 2.59 {RATIO}
LYMPHOCYTES # BLD AUTO: 2.47 10*3/MM3 (ref 0.7–3.1)
LYMPHOCYTES NFR BLD AUTO: 33.7 % (ref 19.6–45.3)
MCH RBC QN AUTO: 30.3 PG (ref 26.6–33)
MCHC RBC AUTO-ENTMCNC: 30.9 G/DL (ref 31.5–35.7)
MCV RBC AUTO: 98 FL (ref 79–97)
MONOCYTES # BLD AUTO: 0.62 10*3/MM3 (ref 0.1–0.9)
MONOCYTES NFR BLD AUTO: 8.5 % (ref 5–12)
NEUTROPHILS NFR BLD AUTO: 4.15 10*3/MM3 (ref 1.7–7)
NEUTROPHILS NFR BLD AUTO: 56.7 % (ref 42.7–76)
NRBC BLD AUTO-RTO: 0 /100 WBC (ref 0–0.2)
PLATELET # BLD AUTO: 158 10*3/MM3 (ref 140–450)
PMV BLD AUTO: 10.2 FL (ref 6–12)
POTASSIUM SERPL-SCNC: 3.5 MMOL/L (ref 3.5–5.2)
RBC # BLD AUTO: 3.99 10*6/MM3 (ref 3.77–5.28)
SODIUM SERPL-SCNC: 138 MMOL/L (ref 136–145)
TRIGL SERPL-MCNC: 131 MG/DL (ref 0–150)
VLDLC SERPL-MCNC: 24 MG/DL (ref 5–40)
WBC NRBC COR # BLD AUTO: 7.32 10*3/MM3 (ref 3.4–10.8)

## 2024-05-09 PROCEDURE — 85025 COMPLETE CBC W/AUTO DIFF WBC: CPT | Performed by: HOSPITALIST

## 2024-05-09 PROCEDURE — 99232 SBSQ HOSP IP/OBS MODERATE 35: CPT | Performed by: INTERNAL MEDICINE

## 2024-05-09 PROCEDURE — 99239 HOSP IP/OBS DSCHRG MGMT >30: CPT | Performed by: HOSPITALIST

## 2024-05-09 PROCEDURE — 94799 UNLISTED PULMONARY SVC/PX: CPT

## 2024-05-09 PROCEDURE — 80061 LIPID PANEL: CPT | Performed by: INTERNAL MEDICINE

## 2024-05-09 PROCEDURE — 82948 REAGENT STRIP/BLOOD GLUCOSE: CPT

## 2024-05-09 PROCEDURE — 63710000001 INSULIN LISPRO (HUMAN) PER 5 UNITS: Performed by: HOSPITALIST

## 2024-05-09 PROCEDURE — 94761 N-INVAS EAR/PLS OXIMETRY MLT: CPT

## 2024-05-09 PROCEDURE — 80048 BASIC METABOLIC PNL TOTAL CA: CPT | Performed by: HOSPITALIST

## 2024-05-09 RX ORDER — ASPIRIN 81 MG/1
81 TABLET ORAL DAILY
Qty: 30 TABLET | Refills: 3 | Status: SHIPPED | OUTPATIENT
Start: 2024-05-10

## 2024-05-09 RX ORDER — PEN NEEDLE, DIABETIC 30 GX3/16"
1 NEEDLE, DISPOSABLE MISCELLANEOUS DAILY
Qty: 30 EACH | Refills: 0 | Status: SHIPPED | OUTPATIENT
Start: 2024-05-09

## 2024-05-09 RX ORDER — NITROGLYCERIN 0.4 MG/1
0.4 TABLET SUBLINGUAL
Qty: 25 TABLET | Refills: 12 | Status: SHIPPED | OUTPATIENT
Start: 2024-05-09

## 2024-05-09 RX ORDER — CARVEDILOL 6.25 MG/1
6.25 TABLET ORAL 2 TIMES DAILY WITH MEALS
Qty: 60 TABLET | Refills: 3 | Status: SHIPPED | OUTPATIENT
Start: 2024-05-09

## 2024-05-09 RX ORDER — SPIRONOLACTONE 25 MG/1
12.5 TABLET ORAL DAILY
Status: DISCONTINUED | OUTPATIENT
Start: 2024-05-09 | End: 2024-05-09

## 2024-05-09 RX ORDER — LANCETS 33 GAUGE
1 EACH MISCELLANEOUS
Qty: 100 EACH | Refills: 12 | Status: SHIPPED | OUTPATIENT
Start: 2024-05-09 | End: 2025-05-09

## 2024-05-09 RX ORDER — BLOOD-GLUCOSE METER
EACH MISCELLANEOUS
Qty: 1 EACH | Refills: 0 | Status: SHIPPED | OUTPATIENT
Start: 2024-05-09

## 2024-05-09 RX ORDER — INSULIN GLARGINE 100 [IU]/ML
10 INJECTION, SOLUTION SUBCUTANEOUS DAILY
Qty: 15 ML | Refills: 12 | Status: SHIPPED | OUTPATIENT
Start: 2024-05-09

## 2024-05-09 RX ORDER — POTASSIUM CHLORIDE 20 MEQ/1
40 TABLET, EXTENDED RELEASE ORAL EVERY 4 HOURS
Status: COMPLETED | OUTPATIENT
Start: 2024-05-09 | End: 2024-05-09

## 2024-05-09 RX ORDER — LOSARTAN POTASSIUM 25 MG/1
25 TABLET ORAL DAILY
Qty: 30 TABLET | Refills: 3 | Status: SHIPPED | OUTPATIENT
Start: 2024-05-10

## 2024-05-09 RX ORDER — ATORVASTATIN CALCIUM 80 MG/1
80 TABLET, FILM COATED ORAL NIGHTLY
Qty: 90 TABLET | Refills: 3 | Status: SHIPPED | OUTPATIENT
Start: 2024-05-09

## 2024-05-09 RX ORDER — FAMOTIDINE 20 MG/1
20 TABLET, FILM COATED ORAL 2 TIMES DAILY
Qty: 60 TABLET | Refills: 3 | Status: SHIPPED | OUTPATIENT
Start: 2024-05-09

## 2024-05-09 RX ORDER — LOSARTAN POTASSIUM 25 MG/1
25 TABLET ORAL DAILY
Status: DISCONTINUED | OUTPATIENT
Start: 2024-05-09 | End: 2024-05-09 | Stop reason: HOSPADM

## 2024-05-09 RX ADMIN — POTASSIUM CHLORIDE 40 MEQ: 1500 TABLET, EXTENDED RELEASE ORAL at 12:01

## 2024-05-09 RX ADMIN — Medication 10 ML: at 09:13

## 2024-05-09 RX ADMIN — INSULIN LISPRO 2 UNITS: 100 INJECTION, SOLUTION INTRAVENOUS; SUBCUTANEOUS at 12:01

## 2024-05-09 RX ADMIN — TICAGRELOR 90 MG: 90 TABLET ORAL at 09:12

## 2024-05-09 RX ADMIN — POTASSIUM CHLORIDE 40 MEQ: 1500 TABLET, EXTENDED RELEASE ORAL at 06:36

## 2024-05-09 RX ADMIN — EMPAGLIFLOZIN 10 MG: 10 TABLET, FILM COATED ORAL at 09:12

## 2024-05-09 RX ADMIN — FAMOTIDINE 20 MG: 20 TABLET, FILM COATED ORAL at 06:30

## 2024-05-09 RX ADMIN — CARVEDILOL 6.25 MG: 6.25 TABLET, FILM COATED ORAL at 09:12

## 2024-05-09 RX ADMIN — DOCUSATE SODIUM 50 MG AND SENNOSIDES 8.6 MG 2 TABLET: 8.6; 5 TABLET, FILM COATED ORAL at 09:12

## 2024-05-09 RX ADMIN — LOSARTAN POTASSIUM 25 MG: 25 TABLET, FILM COATED ORAL at 09:12

## 2024-05-09 RX ADMIN — ASPIRIN 81 MG: 81 TABLET, COATED ORAL at 09:12

## 2024-05-09 NOTE — PLAN OF CARE
Goal Outcome Evaluation:           Progress: no change  Outcome Evaluation: Pt is A/Ox4. VSS. Pt on RA. NS remains infusing as ordered. Pt has no requests at this time. Bed in lowest position, call light in reach, and bed alarm on.         Problem: Adult Inpatient Plan of Care  Goal: Plan of Care Review  Outcome: Ongoing, Progressing  Flowsheets  Taken 5/9/2024 0428 by Catalina Medrano RN  Progress: no change  Outcome Evaluation: Pt is A/Ox4. VSS. Pt on RA. NS remains infusing as ordered. Pt has no requests at this time. Bed in lowest position, call light in reach, and bed alarm on.  Taken 5/8/2024 0447 by Naveed Shaikh RN  Plan of Care Reviewed With: patient  Goal: Patient-Specific Goal (Individualized)  Outcome: Ongoing, Progressing  Goal: Absence of Hospital-Acquired Illness or Injury  Outcome: Ongoing, Progressing  Intervention: Identify and Manage Fall Risk  Recent Flowsheet Documentation  Taken 5/9/2024 0300 by Catalina Medrano RN  Safety Promotion/Fall Prevention:   activity supervised   assistive device/personal items within reach   clutter free environment maintained   fall prevention program maintained   room organization consistent   safety round/check completed  Taken 5/9/2024 0100 by Catalina Medrano RN  Safety Promotion/Fall Prevention:   activity supervised   assistive device/personal items within reach   clutter free environment maintained   fall prevention program maintained   room organization consistent   safety round/check completed  Taken 5/8/2024 2300 by Catalina Medrano RN  Safety Promotion/Fall Prevention:   activity supervised   assistive device/personal items within reach   clutter free environment maintained   fall prevention program maintained   room organization consistent   safety round/check completed  Taken 5/8/2024 2100 by Catalina Medrano RN  Safety Promotion/Fall Prevention:   activity supervised   assistive device/personal items within reach   clutter free environment maintained    fall prevention program maintained   room organization consistent   safety round/check completed  Taken 5/8/2024 1900 by Catalina Medrano RN  Safety Promotion/Fall Prevention:   activity supervised   clutter free environment maintained   assistive device/personal items within reach   fall prevention program maintained   room organization consistent   safety round/check completed  Intervention: Prevent Skin Injury  Recent Flowsheet Documentation  Taken 5/8/2024 2000 by Catalina Medrano RN  Skin Protection:   adhesive use limited   incontinence pads utilized   transparent dressing maintained   tubing/devices free from skin contact  Intervention: Prevent and Manage VTE (Venous Thromboembolism) Risk  Recent Flowsheet Documentation  Taken 5/8/2024 2000 by Catalina Medrano RN  Range of Motion: active ROM (range of motion) encouraged  Intervention: Prevent Infection  Recent Flowsheet Documentation  Taken 5/9/2024 0300 by Catalina Medrano RN  Infection Prevention:   single patient room provided   rest/sleep promoted  Taken 5/9/2024 0100 by Catalina Medrano RN  Infection Prevention:   rest/sleep promoted   single patient room provided  Taken 5/8/2024 2300 by Catalina Medrano RN  Infection Prevention:   rest/sleep promoted   single patient room provided  Taken 5/8/2024 2100 by Catalina Medrano RN  Infection Prevention:   rest/sleep promoted   single patient room provided  Taken 5/8/2024 1900 by Catalina Medrano RN  Infection Prevention:   rest/sleep promoted   single patient room provided  Goal: Optimal Comfort and Wellbeing  Outcome: Ongoing, Progressing  Intervention: Provide Person-Centered Care  Recent Flowsheet Documentation  Taken 5/9/2024 0200 by Catalina Medrano RN  Trust Relationship/Rapport:   care explained   choices provided   emotional support provided   questions answered   thoughts/feelings acknowledged  Taken 5/8/2024 2000 by Catalina Medrano RN  Trust Relationship/Rapport:   care explained   choices provided    emotional support provided   empathic listening provided   questions answered   thoughts/feelings acknowledged  Goal: Readiness for Transition of Care  Outcome: Ongoing, Progressing     Problem: Chest Pain  Goal: Resolution of Chest Pain Symptoms  Outcome: Ongoing, Progressing     Problem: Fall Injury Risk  Goal: Absence of Fall and Fall-Related Injury  Outcome: Ongoing, Progressing  Intervention: Identify and Manage Contributors  Recent Flowsheet Documentation  Taken 5/9/2024 0300 by Catalina Medrano RN  Medication Review/Management: medications reviewed  Taken 5/9/2024 0100 by Catalina Medrano RN  Medication Review/Management: medications reviewed  Taken 5/8/2024 2300 by Catalina Medrano RN  Medication Review/Management: medications reviewed  Taken 5/8/2024 2100 by Catalina Medrano RN  Medication Review/Management: medications reviewed  Taken 5/8/2024 1900 by Catalina Medrano RN  Medication Review/Management: medications reviewed  Intervention: Promote Injury-Free Environment  Recent Flowsheet Documentation  Taken 5/9/2024 0300 by Catalina Medrano RN  Safety Promotion/Fall Prevention:   activity supervised   assistive device/personal items within reach   clutter free environment maintained   fall prevention program maintained   room organization consistent   safety round/check completed  Taken 5/9/2024 0100 by Catalina Medrano RN  Safety Promotion/Fall Prevention:   activity supervised   assistive device/personal items within reach   clutter free environment maintained   fall prevention program maintained   room organization consistent   safety round/check completed  Taken 5/8/2024 2300 by Catalina Medrano RN  Safety Promotion/Fall Prevention:   activity supervised   assistive device/personal items within reach   clutter free environment maintained   fall prevention program maintained   room organization consistent   safety round/check completed  Taken 5/8/2024 2100 by Catalina Medrano RN  Safety Promotion/Fall  Prevention:   activity supervised   assistive device/personal items within reach   clutter free environment maintained   fall prevention program maintained   room organization consistent   safety round/check completed  Taken 5/8/2024 1900 by Catalina Medrano, RN  Safety Promotion/Fall Prevention:   activity supervised   clutter free environment maintained   assistive device/personal items within reach   fall prevention program maintained   room organization consistent   safety round/check completed

## 2024-05-09 NOTE — PHARMACY PATIENT ASSISTANCE
Pharmacy checked on cost/coverage of eliquis and according to her insurance, she will not have a copay.    Thank You;  Linda Miranda, PharmD  05/09/24  15:07 EDT

## 2024-05-09 NOTE — NURSING NOTE
Order received for Cardiac Rehab Consultation.     Patient is scheduled at [facility] for an initial assessment on Wed May 29 2024 at 9 am       Information discussed with: Patient/Family        Educated on: Benefits of Exercise,  Educated on Cardiac Rehab and Program Protocol, Brochure and/or educational material provided, Contact information given, and Teach Back Verified            Thank you for the referral. Please contact the Cardiac Rehab Dept. (ext. [phone]) with any further questions or concerns.

## 2024-05-09 NOTE — DISCHARGE SUMMARY
[facility] HOSPITALIST MEDICINE DISCHARGE SUMMARY    Patient Identification:  Name:  Liyah Beverly  Age:  64 y.o.  Sex:  female  :  1960  MRN:  [id number]  Visit Number:  [id number]    Date of Admission: 2024  Date of Discharge: May 9, 2024  DISCHARGE DISPOSITION   Stable  PCP: Naveed Granados,     DISCHARGE DIAGNOSIS : Acute Non-STEMI status post stent placement LAD, history of recurrent DVT and PE noncompliant with anticoagulation, history of diabetes with hyperglycemia secondary to noncompliance with medication, essential hypertension, history of hyperlipidemia, reduced ejection fraction heart failure EF of 41 to 45% GDMT at goal, history of osteoarthritis.    HOSPITAL COURSE  Patient is a 64 y.o. female presented to [facility] complaining of burning retrosternal radiating to both shoulders and numbness of both hands, this was on and off for the past 3 days prior to admission, on the day of admission became more severe, at the emergency room troponin was elevated no ST elevation noted, patient was placed on aspirin beta-blocker statins and heparin drip.  2D echo did confirm reduced ejection fraction 41 to 45% patient appears to be compensated, during her stay there was no recurrence of chest pain, cardiology was consulted underwent left heart catheterization with 99% stenosis of mid LAD successfully stented.  Cardiology manage patient's cardiac medication per guidelines.  During her stay she remained chest pain-free and has been ambulating well.    On admission she was noted to be hyperglycemic, A1c was more than 9, she confirms she has been diabetic but has not taken any medicine because she does not think she needs it, she also has history of recurrent DVT and 1 episode of PE she also self discontinued her Xarelto in 2016, based on her multiple DVTs and PE, discussion at length with patient the need to be on anticoagulation and should not stop any of her  medication without supervision.      VITAL SIGNS:      05/07/24  2030 05/08/24  0400 05/09/24  0400   Weight: 74.5 kg (164 lb 3.9 oz) 71.9 kg (158 lb 8.2 oz) 71.9 kg (158 lb 8.2 oz)     Body mass index is 24.83 kg/m².  Vitals:    05/09/24 1200   BP:    Pulse:    Resp:    Temp: 99.2 °F (37.3 °C)   SpO2:      PHYSICAL EXAM:  General: Comfortable,awake, alert, oriented to self, place, and time, well-developed and well-nourished.  No respiratory distress.    Skin:  Skin is warm and dry. No rash noted. No pallor.    HENT:  Head:  Normocephalic and atraumatic.  Mouth:  Moist mucous membranes.    Eyes:  Conjunctivae and EOM are normal.  Pupils are equal, round, and reactive to light.  No scleral icterus.    Neck:  Neck supple.  No JVD present.    Pulmonary/Chest:  No respiratory distress, no wheezes, no crackles, with normal breath sounds and good air movement.  Cardiovascular:  Normal rate, regular rhythm and normal heart sounds with no murmur.  Abdominal:  Soft.  Bowel sounds are normal.  No distension and no tenderness.   Extremities:  No edema, no tenderness, and no deformity.  No red or swollen joints anywhere.  Strong pulses in all 4 extremities with no clubbing, no cyanosis, no edema.  Neurological:  Motor strength equal no obvious deficit, sensory grossly intact.   No cranial nerve deficit.  No tongue deviation.  No facial droop.  No slurred speech.    Genitourinary: No Smith catheter  Back:  ----------    DISCHARGE MEDICATIONS:     Discharge Medications        New Medications        Instructions Start Date   apixaban 5 MG tablet tablet  Commonly known as: ELIQUIS   5 mg, Oral, 2 Times Daily      aspirin 81 MG EC tablet   81 mg, Oral, Daily   Start Date: May 10, 2024     atorvastatin 80 MG tablet  Commonly known as: LIPITOR   80 mg, Oral, Nightly      carvedilol 6.25 MG tablet  Commonly known as: COREG   6.25 mg, Oral, 2 Times Daily With Meals      empagliflozin 10 MG tablet tablet  Commonly known as: JARDIANCE    10 mg, Oral, Daily   Start Date: May 10, 2024     famotidine 20 MG tablet  Commonly known as: Pepcid   20 mg, Oral, 2 Times Daily      insulin glargine 100 UNIT/ML injection  Commonly known as: LANTUS, SEMGLEE   10 Units, Subcutaneous, Daily      losartan 25 MG tablet  Commonly known as: COZAAR   25 mg, Oral, Daily   Start Date: May 10, 2024     nitroglycerin 0.4 MG SL tablet  Commonly known as: NITROSTAT   0.4 mg, Sublingual, Every 5 Minutes PRN, Take no more than 3 doses in 15 minutes.      ticagrelor 90 MG tablet tablet  Commonly known as: BRILINTA   90 mg, Oral, 2 Times Daily             Stop These Medications      acetaminophen 650 MG 8 hr tablet  Commonly known as: TYLENOL                    Additional Instructions for the Follow-ups that You Need to Schedule       Discharge Follow-up with PCP   As directed       Currently Documented PCP:    Naveed Granados DO    PCP Phone Number:    [phone]     Follow Up Details: Naveed Granados DO (posthospital follow-up, Accu-Chek diary review)        Discharge Follow-up with Specified Provider: Cardiology; 2 Weeks   As directed      To: Cardiology   Follow Up: 2 Weeks   Follow Up Details: Post stent, non-STEMI               Follow-up Information       Catherine Cruz MD Follow up in 2 week(s).    Specialties: Cardiology, Interventional Cardiology  Contact information:  [address] [facility] [id number] [phone]               Naveed Granados DO .    Specialty: Family Medicine  Why: Naveed Granados DO (posthospital follow-up, Accu-Chek diary review)  Contact information:  [address] [facility] [id number] [phone]                                  Audrey Page MD  05/09/24  13:38 EDT    Please note that this discharge summary required more than 30 minutes to complete.

## 2024-05-09 NOTE — CONSULTS
"Diabetes Education  Assessment/Teaching    Patient Name:  Liyah Beverly  YOB: 1960  MRN: [id number]  Admit Date:  5/7/2024      Assessment Date:  5/9/2024  Flowsheet Row Most Recent Value   General Information     Height 170.2 cm (67\")   Height Method Stated   Weight 71.9 kg (158 lb 8.2 oz)   Weight Method Bed scale   Pregnancy Assessment    Diabetes History    Education Preferences    Nutrition Information    Assessment Topics    DM Goals             Flowsheet Row Most Recent Value   DM Education Needs    Meter Needs meter   Problem Solving Hypoglycemia, Hyperglycemia, Sick days, Signs, Treatment, Symptoms   Reducing Risks Cardiovascular, Immunizations, Foot care, Dental exam, Eye exam, Blood pressure, A1C testing, Lipids, Neuropathy, Retinopathy   Healthy Eating Basic meal plan provided   Physical Activity Walking   Physical Activity Frequency Regularly   Healthy Coping Appropriate   Discharge Plan Follow-up with PCP   Motivation Moderate   Teaching Method Explanation, Discussion, Handouts   Patient Response Verbalized understanding              Other Comments:  A1C 9.8 Patient is needing a blood glucose machine at discharge and MD made aware. Patient was educated and received AADE7 and ADA handouts on diet, activity, checking blood glucose, taking medication as prescribed, checking feet daily and S/S of hypo/hyperglycemia. Patient was educated on sick rule days. Patient had no questions or concerns. Please re-consult or call for concerns or questions. Thank you.        Electronically signed by:  Emperatriz Silva RN  05/09/24 12:50 EDT  "

## 2024-05-09 NOTE — PROGRESS NOTES
LOS: 2 days     Name: Liyah Beverly  Age/Sex: 64 y.o. female  :  1960        PCP: Naveed Granados DO    Principal Problem:    NSTEMI (non-ST elevated myocardial infarction)      Admission Information: Liyah Beverly is a 64 y.o. female with hypertension, diabetes mellitus, recurrent DVT and pulmonary embolism, osteoarthritis, medical noncompliance, and former smoker.     Chief Complaint: Chest pain    Interval history: Underwent invasive coronary angiogram yesterday where she was found to have a 99% blockage in the LAD.  Stent was placed.  Echocardiogram revealed low-level reduction of ejection fraction.    Subjective   Patient awake in bed with  at the bedside at the time of my visit.  She denies chest pain, shortness of breath, palpitations, or other worrisome symptom.  Again we discussed the importance of compliance with dual antiplatelet therapy.  Also counseled that we were starting medications that are indicated in heart failure and that she should take those until told otherwise.  He verbalizes understanding and agreement.      Vital Signs  Vital Signs (last 72 hrs)          0700   0659 0700   0659  0659 59   Most Recent      Temp (°F)   98.6 -  99.4    98.3 -  99.4      98.8     98.8 (37.1)  08    Heart Rate   81 -  108    71 -  93      77     77  0700    Resp   14 -  22    10 -  26      19     19  0700    BP   141/87 -  173/101    95/51 -  141/86      134/77     134/77  0700    SpO2 (%)   92 -  96    94 -  99      98     98  0748    Flow (L/min)       2       2  1120          Temp:  [98.3 °F (36.8 °C)-99.4 °F (37.4 °C)] 98.8 °F (37.1 °C)  Heart Rate:  [71-93] 77  Resp:  [10-26] 19  BP: ()/(51-86) 134/77  Body mass index is 24.83 kg/m².      Intake/Output Summary (Last 24 hours) at 2024 0859  Last data filed at 2024 0839  Gross per 24 hour   Intake 1854.6 ml   Output 850 ml   Net  1004.6 ml       Vitals and nursing note reviewed.   Constitutional:       Appearance: Not in distress.   Eyes:      Conjunctiva/sclera: Conjunctivae normal.   Pulmonary:      Effort: Pulmonary effort is normal.      Breath sounds: Normal breath sounds.   Cardiovascular:      Normal rate. Regular rhythm.      Murmurs: There is no murmur.   Skin:     General: Skin is warm and dry.      Comments: Right femoral cath access site dressing clean, dry, and intact.  Soft to touch and without evidence of hematoma or ecchymosis.   Neurological:      Mental Status: Alert.         Telemetry: Sinus rhythm 70s     Results Review:   Results for orders placed during the hospital encounter of 05/07/24    Cardiac Catheterization/Vascular Study    Conclusion    The ejection fraction was greater than 55% by visual estimate.    Mid LAD lesion is 99% stenosed.    1.  Cardiac.  IVUS guided PTCA and stent placement of the LAD done.  Medical management to continue.  Dual antiplatelet therapy to continue.    Most Recent Echo Result    Adult Transthoracic Echo Complete W/ Cont if Necessary Per Protocol 5/8/2024  4:23 PM    Interpretation Summary    Left ventricular systolic function is mildly decreased. Calculated left ventricular EF = 44% Left ventricular ejection fraction appears to be 41 - 45%.    Left ventricular wall thickness is consistent with borderline concentric hypertrophy. Sigmoid-shaped ventricular septum is present.    The following left ventricular wall segments are hypokinetic: apical anterior.    Left ventricular diastolic function is consistent with (grade I) impaired relaxation.    Moderate mitral valve regurgitation is present.    Signed by: Catherine Cruz MD on 5/8/2024  4:27 PM      Results from last 7 days   Lab Units 05/09/24  0546 05/08/24  0357 05/07/24  2352 05/07/24  1242 05/06/24  1556   WBC 10*3/mm3 7.32 8.92 8.40 7.65 5.63   HEMOGLOBIN g/dL 12.1 13.8 13.5 14.2 13.6   PLATELETS 10*3/mm3 158 161 155 185 154      Results from last 7 days   Lab Units 05/09/24  0546 05/08/24  1459 05/08/24  0357 05/07/24  1242 05/06/24  1556   SODIUM mmol/L 138  --  134* 136 136   POTASSIUM mmol/L 3.5 4.3 3.4* 3.1* 3.0*   CHLORIDE mmol/L 109*  --  101 101 99   CO2 mmol/L 22.2  --  22.7 24.8 23.6   BUN mg/dL 13  --  7* 7* 7*   CREATININE mg/dL 0.61  --  0.61 0.72 0.82   CALCIUM mg/dL 8.2*  --  9.0 9.6 9.7   GLUCOSE mg/dL 156*  --  271* 348* 380*     Results from last 7 days   Lab Units 05/07/24  1457 05/07/24  1242 05/06/24  1556   HSTROP T ng/L 413* 373* 55*       Results from last 7 days   Lab Units 05/07/24  1640   INR  1.08       I reviewed the patient's new clinical results.  I reviewed the patient's new imaging results and agree with the interpretation.  I personally viewed and interpreted the patient's EKG/Telemetry data      Medication Review:   aspirin, 81 mg, Oral, Daily  atorvastatin, 80 mg, Oral, Nightly  carvedilol, 6.25 mg, Oral, BID With Meals  empagliflozin, 10 mg, Oral, Daily  famotidine, 20 mg, Oral, BID AC  [Transfer Hold] insulin glargine, 10 Units, Subcutaneous, Daily  insulin lispro, 2-7 Units, Subcutaneous, 4x Daily AC & at Bedtime  losartan, 25 mg, Oral, Daily  potassium chloride ER, 40 mEq, Oral, Q4H  senna-docusate sodium, 2 tablet, Oral, BID  sodium chloride, 10 mL, Intravenous, Q12H  ticagrelor, 90 mg, Oral, BID      Pharmacy Consult,   sodium chloride, 75 mL/hr, Last Rate: 75 mL/hr (05/08/24 2331)        Assessment:  Coronary artery disease status post stent to the LAD  Post ACS heart failure  Hypertension  Hyperlipidemia      Recommendations:  Dual antiplatelet therapy for at least 1 year.  Beta-blocker, ARB, and high intensity statin initiated  Losartan initiated with plan to transition to Entresto in the outpatient setting.  SGLT2 started.  Beta-blocker on board.  As she is not in florid heart failure will not begin MRA.  At goal  Will reevaluate lipid panel in 3 months-LDL goal will be less than 55    I  discussed the patients findings and my recommendations with patient and family.    Stable for discharge from a cardiology standpoint.    Case discussed with Dr. Cruz and plan of care reflects his recommendations.      Electronically signed by CORBY Goldberg, 05/09/24, 9:04 AM EDT.    Patient seen and examined on rounds on May 9, 2024  Patient has any chest pains or breathing difficulties.  No palpitations denies any palpitations.  Telemetry did not show any significant arrhythmias.  On exam  Alert awake and oriented no distress, sensorimotor are intact  Chest no wheezing or crackle  Abdomen nontender nondistended  No edema    Plan  #1 cardiac.  Patient with history of coronary artery disease with PCI done to the LAD.  Dual antiplatelet therapy to continue lipid-lowering medication to continue.  Beta-blockers and ACE or ARB will be continued.  Will follow-up in 2 weeks postdischarge  .Electronically signed by Catherine Cruz MD, 05/10/24, 4:25 PM EDT.

## 2024-05-09 NOTE — PROGRESS NOTES
Pharmacy was consulted for cost coverage of Brilinta and Jardiance. According to her insurance, she will not have a copay on either.     Thank you.  Dirk Colindres, Pharmacy Intern  05/09/24  08:02 EDT

## 2024-05-09 NOTE — CASE MANAGEMENT/SOCIAL WORK
Case Management Discharge Note      Final Note: Patient is being discharged home on this date 5/9/24.  No needs identified.         Selected Continued Care - Admitted Since 5/7/2024               Final Discharge Disposition Code: 01 - home or self-care

## 2024-05-10 NOTE — PAYOR COMM NOTE
"DISCHARGE NOTIFICATION    REF # [id number]     Liyah Beverly (64 y.o. Female)       Date of Birth   1960    Social Security Number       Address   [address] [place], [place] [address]    Home Phone   [phone]    MRN   [id number]       [facility]    Marital Status                               Admission Date   24    Admission Type   Emergency    Admitting Provider   Audrey Page MD    Attending Provider       Department, Room/Bed   [facility] PROGRESS CARE, P219/1P       Discharge Date   2024    Discharge Disposition   Home or Self Care    Discharge Destination                                 Attending Provider: (none)   Allergies: No Known Allergies    Isolation: None   Infection: None   Code Status: Prior    Ht: 170.2 cm (67\")   Wt: 71.9 kg (158 lb 8.2 oz)    Admission Cmt: None   Principal Problem: NSTEMI (non-ST elevated myocardial infarction) [I21.4]                   Active Insurance as of 2024       Primary Coverage       Payor Plan Insurance Group Employer/Plan Group    [facility] [employer] [place] [employer] [place]        Payor Plan Address Payor Plan Phone Number Payor Plan Fax Number Effective Dates    [address]   2014 - None Entered    [facility] [id number]         Subscriber Name Subscriber Birth Date Member ID       LIYAH BEVERLY 1960 [id number]                     Emergency Contacts        (Rel.) Home Phone Work Phone Mobile Phone    BRODIE BEVERLY (Spouse) [phone] -- --    Cleo Beverly (Daughter) -- -- [phone]                 Discharge Summary        Audrey Page MD at [address] [place] [address] [facility]    Patient Identification:  Name:  Liyah Beverly  Age:  64 y.o.  Sex:  female  :  1960  MRN:  [id number]  Visit Number:  [id number]    Date of Admission: 2024  Date of Discharge: May 9, 2024  DISCHARGE DISPOSITION " "Subjective     Lilibeth Reese is a 37 y.o. female who presents with Toe Injury (Pediatrist removed toe nail today, was supposed to call in a zpack for her and did not, the office closed at 3pm and pt states the pharmacies here in [place] have no medication for her on record.)            Lilibeth was seen today at her podiatrist office with complete right great toenail avulsion.  Antibiotic was recommended but she has not been able to get it.  No fever.  She has had problems with the toe for some time.  Occasional pus discharge.  Pain is controlled.  No active bleeding.  No other aggravating alleviating factors.      Review of Systems   Eyes: Negative for discharge.   Musculoskeletal: Negative for joint pain and myalgias.              Objective     /78 (BP Location: Right arm, Patient Position: Sitting, BP Cuff Size: Adult)   Pulse 62   Temp 36.3 °C (97.3 °F) (Temporal)   Resp 16   Ht 1.626 m (5' 4\")   Wt 73.9 kg (163 lb)   SpO2 98%   BMI 27.98 kg/m²      Physical Exam  Constitutional:       Appearance: Normal appearance.   Musculoskeletal:        Feet:    Neurological:      Mental Status: She is alert.                             Assessment & Plan        1. Paronychia of great toe, right    - amoxicillin-clavulanate (AUGMENTIN) 875-125 MG Tab; Take 1 Tablet by mouth 2 times a day for 7 days.  Dispense: 14 Tablet; Refill: 0    Wound irrigated and redressed.  Follow-up with podiatrist.           "   Stable  PCP: Naveed Granados, DO    DISCHARGE DIAGNOSIS : Acute Non-STEMI status post stent placement LAD, history of recurrent DVT and PE noncompliant with anticoagulation, history of diabetes with hyperglycemia secondary to noncompliance with medication, essential hypertension, history of hyperlipidemia, reduced ejection fraction heart failure EF of 41 to 45% GDMT at goal, history of osteoarthritis.    HOSPITAL COURSE  Patient is a 64 y.o. female presented to [facility] complaining of burning retrosternal radiating to both shoulders and numbness of both hands, this was on and off for the past 3 days prior to admission, on the day of admission became more severe, at the emergency room troponin was elevated no ST elevation noted, patient was placed on aspirin beta-blocker statins and heparin drip.  2D echo did confirm reduced ejection fraction 41 to 45% patient appears to be compensated, during her stay there was no recurrence of chest pain, cardiology was consulted underwent left heart catheterization with 99% stenosis of mid LAD successfully stented.  Cardiology manage patient's cardiac medication per guidelines.  During her stay she remained chest pain-free and has been ambulating well.    On admission she was noted to be hyperglycemic, A1c was more than 9, she confirms she has been diabetic but has not taken any medicine because she does not think she needs it, she also has history of recurrent DVT and 1 episode of PE she also self discontinued her Xarelto in 2016, based on her multiple DVTs and PE, discussion at length with patient the need to be on anticoagulation and should not stop any of her medication without supervision.      VITAL SIGNS:      05/07/24  2030 05/08/24  0400 05/09/24  0400   Weight: 74.5 kg (164 lb 3.9 oz) 71.9 kg (158 lb 8.2 oz) 71.9 kg (158 lb 8.2 oz)     Body mass index is 24.83 kg/m².  Vitals:    05/09/24 1200   BP:    Pulse:    Resp:    Temp: 99.2 °F (37.3 °C)    SpO2:      PHYSICAL EXAM:  General: Comfortable,awake, alert, oriented to self, place, and time, well-developed and well-nourished.  No respiratory distress.    Skin:  Skin is warm and dry. No rash noted. No pallor.    HENT:  Head:  Normocephalic and atraumatic.  Mouth:  Moist mucous membranes.    Eyes:  Conjunctivae and EOM are normal.  Pupils are equal, round, and reactive to light.  No scleral icterus.    Neck:  Neck supple.  No JVD present.    Pulmonary/Chest:  No respiratory distress, no wheezes, no crackles, with normal breath sounds and good air movement.  Cardiovascular:  Normal rate, regular rhythm and normal heart sounds with no murmur.  Abdominal:  Soft.  Bowel sounds are normal.  No distension and no tenderness.   Extremities:  No edema, no tenderness, and no deformity.  No red or swollen joints anywhere.  Strong pulses in all 4 extremities with no clubbing, no cyanosis, no edema.  Neurological:  Motor strength equal no obvious deficit, sensory grossly intact.   No cranial nerve deficit.  No tongue deviation.  No facial droop.  No slurred speech.    Genitourinary: No Smith catheter  Back:  ----------    DISCHARGE MEDICATIONS:     Discharge Medications        New Medications        Instructions Start Date   apixaban 5 MG tablet tablet  Commonly known as: ELIQUIS   5 mg, Oral, 2 Times Daily      aspirin 81 MG EC tablet   81 mg, Oral, Daily   Start Date: May 10, 2024     atorvastatin 80 MG tablet  Commonly known as: LIPITOR   80 mg, Oral, Nightly      carvedilol 6.25 MG tablet  Commonly known as: COREG   6.25 mg, Oral, 2 Times Daily With Meals      empagliflozin 10 MG tablet tablet  Commonly known as: JARDIANCE   10 mg, Oral, Daily   Start Date: May 10, 2024     famotidine 20 MG tablet  Commonly known as: Pepcid   20 mg, Oral, 2 Times Daily      insulin glargine 100 UNIT/ML injection  Commonly known as: LANTUS, SEMGLEE   10 Units, Subcutaneous, Daily      losartan 25 MG tablet  Commonly known as: COZAAR    25 mg, Oral, Daily   Start Date: May 10, 2024     nitroglycerin 0.4 MG SL tablet  Commonly known as: NITROSTAT   0.4 mg, Sublingual, Every 5 Minutes PRN, Take no more than 3 doses in 15 minutes.      ticagrelor 90 MG tablet tablet  Commonly known as: BRILINTA   90 mg, Oral, 2 Times Daily             Stop These Medications      acetaminophen 650 MG 8 hr tablet  Commonly known as: TYLENOL                    Additional Instructions for the Follow-ups that You Need to Schedule       Discharge Follow-up with PCP   As directed       Currently Documented PCP:    Naveed Granados DO    PCP Phone Number:    [phone]     Follow Up Details: Naveed Granados DO (posthospital follow-up, Accu-Chek diary review)        Discharge Follow-up with Specified Provider: Cardiology; 2 Weeks   As directed      To: Cardiology   Follow Up: 2 Weeks   Follow Up Details: Post stent, non-STEMI               Follow-up Information       Catherine Cruz MD Follow up in 2 week(s).    Specialties: Cardiology, Interventional Cardiology  Contact information:  [address]  [facility] [address]  [facility] [id number] [phone]               Naveed Granados DO .    Specialty: Family Medicine  Why: Naveed Granados DO (posthospital follow-up, Accu-Chek diary review)  Contact information:  [address] [facility] [address]  [facility] [id number] [phone]                                  Audrey Page MD  05/09/24  13:38 EDT    Please note that this discharge summary required more than 30 minutes to complete.      Electronically signed by Audrey Page MD at 05/09/24 6816

## 2024-05-11 LAB
QT INTERVAL: 380 MS
QTC INTERVAL: 435 MS

## 2024-05-14 ENCOUNTER — READMISSION MANAGEMENT (OUTPATIENT)
Dept: CALL CENTER | Facility: HOSPITAL | Age: 64
End: 2024-05-14
Payer: COMMERCIAL

## 2024-05-14 NOTE — OUTREACH NOTE
AMI Week 1 Survey      Flowsheet Row Responses   [facility] patient discharged from? [facility]   Does the patient have one of the following disease processes/diagnoses(primary or secondary)? Acute MI (STEMI,NSTEMI)   Week 1 attempt successful? Yes   Call start time 1629   Unsuccessful attempts Attempt 1  [All numbers listed were attempted-no answer]   Call end time 1630   Discharge diagnosis NSTEMI   Meds reviewed with patient/caregiver? Yes   Is the patient taking all medications as directed (includes completed medication regime)? Yes   Comments regarding appointments Cards apt on 5/23/24   Does the patient have a primary care provider?  Yes   Comments regarding PCP PCP apt 5/15/24   Has the patient kept scheduled appointments due by today? Yes   Did the patient receive a copy of their discharge instructions? Yes   Nursing interventions Reviewed instructions with patient   What is the patient's perception of their health status since discharge? Improving   Is the patient/caregiver able to teach back signs and symptoms of when to call for help immediately: Sudden chest discomfort, Shortness of breath at any time   Nursing interventions Nurse provided patient education   Is the pateint /caregiver able to teach back the importance of cardiac rehab? Yes   Is the patient/caregiver able to teach back ways to prevent a second heart attack: Take medications, Follow up with MD   If the patient is a current smoker, are they able to teach back resources for cessation? Not a smoker   Is the patient/caregiver able to teach back the hierarchy of who to call/visit for symptoms/problems? PCP, Specialist, Home health nurse, Urgent Care, ED, 911 Yes   Week 1 call completed? Yes   Revoked No further contact(revokes)-requires comment   Graduated/Revoked comments Pt improving - has FU with her PCP/cards   Call end time 1630            Susan JACINTO - Registered Nurse

## 2024-05-23 ENCOUNTER — TELEPHONE (OUTPATIENT)
Dept: CARDIOLOGY | Facility: CLINIC | Age: 64
End: 2024-05-23

## 2024-05-23 NOTE — TELEPHONE ENCOUNTER
Caller: Liyah Beverly    Relationship: Self    Best call back number: [phone]     What was the call regarding: PT HAD FOLLOW APPT ON 5/23 AND DUE TO UNFORSEEN CIRCUMSTANCES, THEY HAD TO RESCHEDULE. NEXT AVAILABLE WAS  6/27. JUST WANTED TO MAKE OFFICE AWARE.  APPT NOTES: HOS F/U

## 2024-05-28 ENCOUNTER — TELEPHONE (OUTPATIENT)
Dept: CARDIAC REHAB | Facility: HOSPITAL | Age: 64
End: 2024-05-28
Payer: COMMERCIAL

## 2024-05-28 NOTE — TELEPHONE ENCOUNTER
Patient called stating she could not keep intake appointment and needed to push. Patient asked to call back after she had a chance to look at her appointment book. Currently scheduled for 6/5, will adjust when she calls back and gives us a date that works.

## 2024-06-12 RX ORDER — PEN NEEDLE, DIABETIC 30 GX3/16"
1 NEEDLE, DISPOSABLE MISCELLANEOUS DAILY
Qty: 30 EACH | Refills: 0 | Status: CANCELLED | OUTPATIENT
Start: 2024-06-12

## (undated) DEVICE — LN FLTR ORL/NASL MICROSTREAM NONINTUB A/LNG

## (undated) DEVICE — EP LEFT SUBCLAVIAN SHIELD-YELLOW: Brand: RADPAD

## (undated) DEVICE — PAD, DEFIB, ADULT, RADIOTRANS, ZOLL: Brand: MEDLINE

## (undated) DEVICE — 360 - 360I 10"/10" CMSQ 8RA: Brand: MEDLINE

## (undated) DEVICE — SHEATH INTRO SUPERSHEATH JWIRE .035 6F 11CM

## (undated) DEVICE — ADULT DISPOSABLE SINGLE-PATIENT USE PULSE OXIMETER SENSOR: Brand: NONIN

## (undated) DEVICE — CATH INTRAVAS ULTRASND EAGLE EYE 2.9FR

## (undated) DEVICE — CATH F5 INF PIG145 110CM 6SH: Brand: INFINITI

## (undated) DEVICE — Device: Brand: ASAHI SION BLUE

## (undated) DEVICE — ANGIO-SEAL STS PLUS VASCULAR CLOSURE DEVICE: Brand: ANGIO-SEAL

## (undated) DEVICE — ST EXT IV SMRTSTE 2VLV FIX M LL 6ML 41

## (undated) DEVICE — PK CATH CARD 70

## (undated) DEVICE — GW INQW FIX/CORE PTFE J/3MM .035 260CM

## (undated) DEVICE — CATH F5 INF JL 4 100CM: Brand: INFINITI

## (undated) DEVICE — DEV INFL MONARCH 25W

## (undated) DEVICE — 6F .070 XB 3.5 100CM: Brand: VISTA BRITE TIP

## (undated) DEVICE — TREK CORONARY DILATATION CATHETER 2.50 MM X 8 MM / RAPID-EXCHANGE: Brand: TREK

## (undated) DEVICE — SHEATH INTRO SUPERSHEATH SHRT .035 4F 11CM

## (undated) DEVICE — ST INF PRI SMRTSTE 20DRP 2VLV 24ML 117

## (undated) DEVICE — CATH F5 INF 3DRC 100CM: Brand: INFINITI